# Patient Record
Sex: MALE | Race: WHITE | Employment: FULL TIME | URBAN - NONMETROPOLITAN AREA
[De-identification: names, ages, dates, MRNs, and addresses within clinical notes are randomized per-mention and may not be internally consistent; named-entity substitution may affect disease eponyms.]

---

## 2018-08-13 LAB
ALBUMIN SERPL-MCNC: 4.2 G/DL
ALP BLD-CCNC: 65 U/L
ALT SERPL-CCNC: 24 U/L
ANION GAP SERPL CALCULATED.3IONS-SCNC: 11 MMOL/L
AST SERPL-CCNC: 21 U/L
BASOPHILS ABSOLUTE: 100 /ΜL
BASOPHILS RELATIVE PERCENT: 0.7 %
BILIRUB SERPL-MCNC: 1 MG/DL (ref 0.1–1.4)
BUN BLDV-MCNC: 18 MG/DL
CALCIUM SERPL-MCNC: 9 MG/DL
CHLORIDE BLD-SCNC: 101 MMOL/L
CO2: 26 MMOL/L
CREAT SERPL-MCNC: 1.26 MG/DL
EOSINOPHILS ABSOLUTE: 300 /ΜL
EOSINOPHILS RELATIVE PERCENT: 3.5 %
GFR CALCULATED: NORMAL
GLUCOSE BLD-MCNC: 109 MG/DL
HCT VFR BLD CALC: 44 % (ref 41–53)
HEMOGLOBIN: 14.9 G/DL (ref 13.5–17.5)
LYMPHOCYTES ABSOLUTE: 1900 /ΜL
LYMPHOCYTES RELATIVE PERCENT: 24.3 %
MCH RBC QN AUTO: 30.9 PG
MCHC RBC AUTO-ENTMCNC: 33.8 G/DL
MCV RBC AUTO: 91.4 FL
MONOCYTES ABSOLUTE: 800 /ΜL
MONOCYTES RELATIVE PERCENT: 10.1 %
NEUTROPHILS ABSOLUTE: 4800 /ΜL
NEUTROPHILS RELATIVE PERCENT: 61.4 %
PDW BLD-RTO: 13.3 %
PLATELET # BLD: 246 K/ΜL
PMV BLD AUTO: NORMAL FL
POTASSIUM SERPL-SCNC: 3.7 MMOL/L
RBC # BLD: 4.81 10^6/ΜL
SODIUM BLD-SCNC: 138 MMOL/L
TOTAL PROTEIN: 6.3
WBC # BLD: 7.8 10^3/ML

## 2018-08-14 ENCOUNTER — HOSPITAL ENCOUNTER (OUTPATIENT)
Dept: CT IMAGING | Age: 58
Discharge: HOME | End: 2018-08-14
Payer: COMMERCIAL

## 2018-08-14 ENCOUNTER — TELEPHONE (OUTPATIENT)
Dept: UROLOGY | Age: 58
End: 2018-08-14

## 2018-08-14 ENCOUNTER — HOSPITAL ENCOUNTER (OUTPATIENT)
Dept: GENERAL RADIOLOGY | Age: 58
Discharge: HOME | End: 2018-08-14
Payer: COMMERCIAL

## 2018-08-14 ENCOUNTER — OFFICE VISIT (OUTPATIENT)
Dept: UROLOGY | Age: 58
End: 2018-08-14
Payer: COMMERCIAL

## 2018-08-14 ENCOUNTER — HOSPITAL ENCOUNTER (OUTPATIENT)
Age: 58
Discharge: HOME | End: 2018-08-14
Payer: COMMERCIAL

## 2018-08-14 VITALS
SYSTOLIC BLOOD PRESSURE: 116 MMHG | HEIGHT: 69 IN | WEIGHT: 198 LBS | DIASTOLIC BLOOD PRESSURE: 70 MMHG | BODY MASS INDEX: 29.33 KG/M2

## 2018-08-14 DIAGNOSIS — Z01.818 PREOPERATIVE TESTING: ICD-10-CM

## 2018-08-14 DIAGNOSIS — N20.0 KIDNEY STONE: Primary | ICD-10-CM

## 2018-08-14 DIAGNOSIS — Z00.6 ENCOUNTER FOR EXAMINATION FOR NORMAL COMPARISON AND CONTROL IN CLINICAL RESEARCH PROGRAM: ICD-10-CM

## 2018-08-14 DIAGNOSIS — N20.0 KIDNEY STONE: ICD-10-CM

## 2018-08-14 LAB
ALBUMIN SERPL-MCNC: 4.2 G/DL
ALP BLD-CCNC: 65 U/L
ALT SERPL-CCNC: 24 U/L
ANION GAP SERPL CALCULATED.3IONS-SCNC: 11 MMOL/L
AST SERPL-CCNC: 21 U/L
BASOPHILS ABSOLUTE: 100 /ΜL
BASOPHILS RELATIVE PERCENT: 0.7 %
BILIRUB SERPL-MCNC: 1 MG/DL (ref 0.1–1.4)
BILIRUBIN URINE: NEGATIVE
BLOOD URINE, POC: ABNORMAL
BUN BLDV-MCNC: 18 MG/DL
CALCIUM SERPL-MCNC: 9 MG/DL
CHARACTER, URINE: CLEAR
CHLORIDE BLD-SCNC: 101 MMOL/L
CO2: 26 MMOL/L
COLOR, URINE: YELLOW
CREAT SERPL-MCNC: 1.26 MG/DL
EOSINOPHILS ABSOLUTE: 300 /ΜL
EOSINOPHILS RELATIVE PERCENT: 3.5 %
GFR CALCULATED: NORMAL
GLUCOSE BLD-MCNC: 109 MG/DL
GLUCOSE URINE: NEGATIVE MG/DL
HCT VFR BLD CALC: 44 % (ref 41–53)
HEMOGLOBIN: 14.9 G/DL (ref 13.5–17.5)
KETONES, URINE: NEGATIVE
LEUKOCYTE CLUMPS, URINE: NEGATIVE
LYMPHOCYTES ABSOLUTE: 1900 /ΜL
LYMPHOCYTES RELATIVE PERCENT: 24.3 %
MCH RBC QN AUTO: 30.9 PG
MCHC RBC AUTO-ENTMCNC: 33.8 G/DL
MCV RBC AUTO: 91.4 FL
MONOCYTES ABSOLUTE: 800 /ΜL
MONOCYTES RELATIVE PERCENT: 10.1 %
NEUTROPHILS ABSOLUTE: 4800 /ΜL
NEUTROPHILS RELATIVE PERCENT: 61.4 %
NITRITE, URINE: NEGATIVE
PDW BLD-RTO: 13.3 %
PH, URINE: 6
PLATELET # BLD: 246 K/ΜL
PMV BLD AUTO: NORMAL FL
POTASSIUM SERPL-SCNC: 3.7 MMOL/L
PROTEIN, URINE: NEGATIVE MG/DL
RBC # BLD: 4.81 10^6/ΜL
SODIUM BLD-SCNC: 138 MMOL/L
SPECIFIC GRAVITY, URINE: 1.01 (ref 1–1.03)
TOTAL PROTEIN: 6.3
UROBILINOGEN, URINE: 0.2 EU/DL
WBC # BLD: 7.8 10^3/ML

## 2018-08-14 PROCEDURE — G8427 DOCREV CUR MEDS BY ELIG CLIN: HCPCS | Performed by: NURSE PRACTITIONER

## 2018-08-14 PROCEDURE — 1036F TOBACCO NON-USER: CPT | Performed by: NURSE PRACTITIONER

## 2018-08-14 PROCEDURE — 3017F COLORECTAL CA SCREEN DOC REV: CPT | Performed by: NURSE PRACTITIONER

## 2018-08-14 PROCEDURE — 3209999900 CT COMPARISON OF OUTSIDE FILMS

## 2018-08-14 PROCEDURE — 81003 URINALYSIS AUTO W/O SCOPE: CPT | Performed by: NURSE PRACTITIONER

## 2018-08-14 PROCEDURE — G8419 CALC BMI OUT NRM PARAM NOF/U: HCPCS | Performed by: NURSE PRACTITIONER

## 2018-08-14 PROCEDURE — 99203 OFFICE O/P NEW LOW 30 MIN: CPT | Performed by: NURSE PRACTITIONER

## 2018-08-14 PROCEDURE — 71046 X-RAY EXAM CHEST 2 VIEWS: CPT

## 2018-08-14 RX ORDER — ATORVASTATIN CALCIUM 20 MG/1
20 TABLET, FILM COATED ORAL DAILY
COMMUNITY

## 2018-08-14 RX ORDER — OMEPRAZOLE 20 MG/1
20 CAPSULE, DELAYED RELEASE ORAL DAILY
COMMUNITY

## 2018-08-14 RX ORDER — FLUTICASONE PROPIONATE 50 MCG
1 SPRAY, SUSPENSION (ML) NASAL DAILY
COMMUNITY

## 2018-08-14 RX ORDER — ESCITALOPRAM OXALATE 10 MG/1
10 TABLET ORAL DAILY
COMMUNITY

## 2018-08-14 NOTE — PROGRESS NOTES
normal. No rashes or lesions. Psychiatric:        Normal mood and affect. Labs   WBC:    Lab Results   Component Value Date    WBC 7.8 08/14/2018     Hemoglobin/Hematocrit:    Lab Results   Component Value Date    HGB 14.9 08/14/2018    HCT 44.0 08/14/2018     BMP:    Lab Results   Component Value Date     08/14/2018    K 3.7 08/14/2018     08/14/2018    CO2 26 08/14/2018    BUN 18 08/14/2018    LABALBU 4.2 08/14/2018    CREATININE 1.26 08/14/2018    CALCIUM 9.0 08/14/2018     PT/INR:  No results found for: PROTIME, INR  PTT:  No results found for: APTT[APTT  Urinalysis:   Lab Results   Component Value Date    BLOODU Large 08/14/2018    NITRU Negative 08/14/2018    COLORU Yellow 08/14/2018     Urine Culture:  No results found for: Lakeisha Kirk  Blood Culture:  No results found for: [facility]      Radiology  The patient has had a CT Stone Protocol which I have reviewed along with its accompanying report. The study demonstrates:        Results for POC orders placed in visit on 08/14/18   POCT Urinalysis No Micro (Auto)   Result Value Ref Range    Glucose, Ur Negative NEGATIVE mg/dl    Bilirubin Urine Negative     Ketones, Urine Negative NEGATIVE    Specific Gravity, Urine 1.015 1.002 - 1.03    Blood, UA POC Large (A) NEGATIVE    pH, Urine 6.00 5.0 - 9.0    Protein, Urine Negative NEGATIVE mg/dl    Urobilinogen, Urine 0.20 0.0 - 1.0 eu/dl    Nitrite, Urine Negative NEGATIVE    Leukocyte Clumps, Urine Negative NEGATIVE    Color, Urine Yellow YELLOW-STR    Character, Urine Clear [id number]       Assessment    Left Proximal Ureteral Stone  Left Hydroureternephrosis  Bilateral Renal Stones  SYL      Plan    Patient is having significant pain and would like the stone taken care of as well as the stones in his left kidney. He will be scheduled for Cystoscopy, left ureteroscopy, left ureter-nephroscopy, laser lithotripsy, basket retrieval of stone fragments, and left ureteral stent placement per Dr. Larry Harden.  NPO

## 2018-08-15 ENCOUNTER — ANESTHESIA (OUTPATIENT)
Dept: OPERATING ROOM | Age: 58
End: 2018-08-15
Payer: COMMERCIAL

## 2018-08-15 ENCOUNTER — HOSPITAL ENCOUNTER (OUTPATIENT)
Age: 58
Setting detail: OUTPATIENT SURGERY
Discharge: HOME | End: 2018-08-15
Attending: UROLOGY | Admitting: UROLOGY
Payer: COMMERCIAL

## 2018-08-15 ENCOUNTER — ANESTHESIA EVENT (OUTPATIENT)
Dept: OPERATING ROOM | Age: 58
End: 2018-08-15
Payer: COMMERCIAL

## 2018-08-15 VITALS
RESPIRATION RATE: 5 BRPM | DIASTOLIC BLOOD PRESSURE: 52 MMHG | OXYGEN SATURATION: 100 % | SYSTOLIC BLOOD PRESSURE: 87 MMHG

## 2018-08-15 VITALS
TEMPERATURE: 97.6 F | HEART RATE: 91 BPM | SYSTOLIC BLOOD PRESSURE: 135 MMHG | WEIGHT: 195 LBS | RESPIRATION RATE: 16 BRPM | DIASTOLIC BLOOD PRESSURE: 71 MMHG | BODY MASS INDEX: 28.88 KG/M2 | HEIGHT: 69 IN | OXYGEN SATURATION: 97 %

## 2018-08-15 DIAGNOSIS — N20.0 RENAL STONES: Primary | ICD-10-CM

## 2018-08-15 PROCEDURE — 7100000011 HC PHASE II RECOVERY - ADDTL 15 MIN: Performed by: UROLOGY

## 2018-08-15 PROCEDURE — 2709999900 HC NON-CHARGEABLE SUPPLY: Performed by: UROLOGY

## 2018-08-15 PROCEDURE — 7100000001 HC PACU RECOVERY - ADDTL 15 MIN: Performed by: UROLOGY

## 2018-08-15 PROCEDURE — 3700000000 HC ANESTHESIA ATTENDED CARE: Performed by: UROLOGY

## 2018-08-15 PROCEDURE — 2580000003 HC RX 258

## 2018-08-15 PROCEDURE — 3600000013 HC SURGERY LEVEL 3 ADDTL 15MIN: Performed by: UROLOGY

## 2018-08-15 PROCEDURE — C1769 GUIDE WIRE: HCPCS | Performed by: UROLOGY

## 2018-08-15 PROCEDURE — C1894 INTRO/SHEATH, NON-LASER: HCPCS | Performed by: UROLOGY

## 2018-08-15 PROCEDURE — 52356 CYSTO/URETERO W/LITHOTRIPSY: CPT | Performed by: UROLOGY

## 2018-08-15 PROCEDURE — 7100000000 HC PACU RECOVERY - FIRST 15 MIN: Performed by: UROLOGY

## 2018-08-15 PROCEDURE — 3700000001 HC ADD 15 MINUTES (ANESTHESIA): Performed by: UROLOGY

## 2018-08-15 PROCEDURE — 6370000000 HC RX 637 (ALT 250 FOR IP): Performed by: NURSE PRACTITIONER

## 2018-08-15 PROCEDURE — C2617 STENT, NON-COR, TEM W/O DEL: HCPCS | Performed by: UROLOGY

## 2018-08-15 PROCEDURE — 3600000003 HC SURGERY LEVEL 3 BASE: Performed by: UROLOGY

## 2018-08-15 PROCEDURE — 82365 CALCULUS SPECTROSCOPY: CPT

## 2018-08-15 PROCEDURE — 6360000002 HC RX W HCPCS: Performed by: NURSE ANESTHETIST, CERTIFIED REGISTERED

## 2018-08-15 PROCEDURE — C1773 RET DEV, INSERTABLE: HCPCS | Performed by: UROLOGY

## 2018-08-15 PROCEDURE — 2720000010 HC SURG SUPPLY STERILE: Performed by: UROLOGY

## 2018-08-15 PROCEDURE — 2500000003 HC RX 250 WO HCPCS: Performed by: NURSE ANESTHETIST, CERTIFIED REGISTERED

## 2018-08-15 PROCEDURE — 6370000000 HC RX 637 (ALT 250 FOR IP)

## 2018-08-15 PROCEDURE — 7100000010 HC PHASE II RECOVERY - FIRST 15 MIN: Performed by: UROLOGY

## 2018-08-15 PROCEDURE — 99999 PR OFFICE/OUTPT VISIT,PROCEDURE ONLY: CPT | Performed by: UROLOGY

## 2018-08-15 DEVICE — VARIABLE LENGTH URETERAL STENT
Type: IMPLANTABLE DEVICE | Site: URETER | Status: FUNCTIONAL
Brand: CONTOUR VL™

## 2018-08-15 RX ORDER — DEXAMETHASONE SODIUM PHOSPHATE 4 MG/ML
INJECTION, SOLUTION INTRA-ARTICULAR; INTRALESIONAL; INTRAMUSCULAR; INTRAVENOUS; SOFT TISSUE PRN
Status: DISCONTINUED | OUTPATIENT
Start: 2018-08-15 | End: 2018-08-15 | Stop reason: SDUPTHER

## 2018-08-15 RX ORDER — OXYBUTYNIN CHLORIDE 5 MG/1
5 TABLET ORAL 3 TIMES DAILY
Status: DISCONTINUED | OUTPATIENT
Start: 2018-08-15 | End: 2018-08-15 | Stop reason: HOSPADM

## 2018-08-15 RX ORDER — HYDROCODONE BITARTRATE AND ACETAMINOPHEN 5; 325 MG/1; MG/1
1 TABLET ORAL EVERY 6 HOURS PRN
Qty: 10 TABLET | Refills: 0 | Status: SHIPPED | OUTPATIENT
Start: 2018-08-15 | End: 2018-08-18

## 2018-08-15 RX ORDER — GLYCOPYRROLATE 1 MG/5 ML
SYRINGE (ML) INTRAVENOUS PRN
Status: DISCONTINUED | OUTPATIENT
Start: 2018-08-15 | End: 2018-08-15 | Stop reason: SDUPTHER

## 2018-08-15 RX ORDER — CIPROFLOXACIN 2 MG/ML
400 INJECTION, SOLUTION INTRAVENOUS
Status: DISCONTINUED | OUTPATIENT
Start: 2018-08-15 | End: 2018-08-15 | Stop reason: HOSPADM

## 2018-08-15 RX ORDER — MORPHINE SULFATE 2 MG/ML
4 INJECTION, SOLUTION INTRAMUSCULAR; INTRAVENOUS
Status: DISCONTINUED | OUTPATIENT
Start: 2018-08-15 | End: 2018-08-15 | Stop reason: HOSPADM

## 2018-08-15 RX ORDER — HYDROCODONE BITARTRATE AND ACETAMINOPHEN 5; 325 MG/1; MG/1
2 TABLET ORAL EVERY 4 HOURS PRN
Status: DISCONTINUED | OUTPATIENT
Start: 2018-08-15 | End: 2018-08-15 | Stop reason: HOSPADM

## 2018-08-15 RX ORDER — OXYBUTYNIN CHLORIDE 5 MG/1
TABLET ORAL
Status: COMPLETED
Start: 2018-08-15 | End: 2018-08-15

## 2018-08-15 RX ORDER — ATROPA BELLADONNA AND OPIUM 16.2; 3 MG/1; MG/1
SUPPOSITORY RECTAL
Status: COMPLETED
Start: 2018-08-15 | End: 2018-08-15

## 2018-08-15 RX ORDER — SODIUM CHLORIDE 9 MG/ML
INJECTION, SOLUTION INTRAVENOUS CONTINUOUS
Status: DISCONTINUED | OUTPATIENT
Start: 2018-08-15 | End: 2018-08-15 | Stop reason: HOSPADM

## 2018-08-15 RX ORDER — FENTANYL CITRATE 50 UG/ML
INJECTION, SOLUTION INTRAMUSCULAR; INTRAVENOUS PRN
Status: DISCONTINUED | OUTPATIENT
Start: 2018-08-15 | End: 2018-08-15 | Stop reason: SDUPTHER

## 2018-08-15 RX ORDER — CIPROFLOXACIN 2 MG/ML
INJECTION, SOLUTION INTRAVENOUS PRN
Status: DISCONTINUED | OUTPATIENT
Start: 2018-08-15 | End: 2018-08-15 | Stop reason: SDUPTHER

## 2018-08-15 RX ORDER — ACETAMINOPHEN 325 MG/1
650 TABLET ORAL EVERY 4 HOURS PRN
Status: DISCONTINUED | OUTPATIENT
Start: 2018-08-15 | End: 2018-08-15 | Stop reason: HOSPADM

## 2018-08-15 RX ORDER — ATROPA BELLADONNA AND OPIUM 16.2; 3 MG/1; MG/1
60 SUPPOSITORY RECTAL EVERY 8 HOURS PRN
Status: DISCONTINUED | OUTPATIENT
Start: 2018-08-15 | End: 2018-08-15

## 2018-08-15 RX ORDER — MORPHINE SULFATE 2 MG/ML
2 INJECTION, SOLUTION INTRAMUSCULAR; INTRAVENOUS
Status: DISCONTINUED | OUTPATIENT
Start: 2018-08-15 | End: 2018-08-15 | Stop reason: HOSPADM

## 2018-08-15 RX ORDER — PROPOFOL 10 MG/ML
INJECTION, EMULSION INTRAVENOUS PRN
Status: DISCONTINUED | OUTPATIENT
Start: 2018-08-15 | End: 2018-08-15 | Stop reason: SDUPTHER

## 2018-08-15 RX ORDER — HYDROCODONE BITARTRATE AND ACETAMINOPHEN 5; 325 MG/1; MG/1
1 TABLET ORAL EVERY 4 HOURS PRN
Status: DISCONTINUED | OUTPATIENT
Start: 2018-08-15 | End: 2018-08-15 | Stop reason: HOSPADM

## 2018-08-15 RX ORDER — CIPROFLOXACIN 500 MG/1
500 TABLET, FILM COATED ORAL 2 TIMES DAILY
Qty: 6 TABLET | Refills: 0 | Status: SHIPPED | OUTPATIENT
Start: 2018-08-15 | End: 2018-08-18

## 2018-08-15 RX ORDER — ATROPA BELLADONNA AND OPIUM 16.2; 3 MG/1; MG/1
30 SUPPOSITORY RECTAL EVERY 8 HOURS PRN
Status: DISCONTINUED | OUTPATIENT
Start: 2018-08-15 | End: 2018-08-15 | Stop reason: HOSPADM

## 2018-08-15 RX ORDER — OXYBUTYNIN CHLORIDE 5 MG/1
5 TABLET ORAL 3 TIMES DAILY PRN
Qty: 90 TABLET | Refills: 3 | Status: ON HOLD | OUTPATIENT
Start: 2018-08-15 | End: 2021-04-30

## 2018-08-15 RX ADMIN — SODIUM CHLORIDE: 9 INJECTION, SOLUTION INTRAVENOUS at 13:17

## 2018-08-15 RX ADMIN — OXYBUTYNIN CHLORIDE 5 MG: 5 TABLET ORAL at 17:15

## 2018-08-15 RX ADMIN — Medication 0.4 MG: at 15:39

## 2018-08-15 RX ADMIN — FENTANYL CITRATE 100 MCG: 50 INJECTION INTRAMUSCULAR; INTRAVENOUS at 15:17

## 2018-08-15 RX ADMIN — DEXAMETHASONE SODIUM PHOSPHATE 8 MG: 4 INJECTION, SOLUTION INTRAMUSCULAR; INTRAVENOUS at 15:39

## 2018-08-15 RX ADMIN — CIPROFLOXACIN 400 MG: 2 INJECTION, SOLUTION INTRAVENOUS at 15:23

## 2018-08-15 RX ADMIN — ATROPA BELLADONNA AND OPIUM 30 MG: 16.2; 3 SUPPOSITORY RECTAL at 17:50

## 2018-08-15 RX ADMIN — HYDROCODONE BITARTRATE AND ACETAMINOPHEN 2 TABLET: 5; 325 TABLET ORAL at 17:10

## 2018-08-15 RX ADMIN — PROPOFOL 200 MG: 10 INJECTION, EMULSION INTRAVENOUS at 15:17

## 2018-08-15 ASSESSMENT — PULMONARY FUNCTION TESTS
PIF_VALUE: 4
PIF_VALUE: 13
PIF_VALUE: 17
PIF_VALUE: 2
PIF_VALUE: 12
PIF_VALUE: 5
PIF_VALUE: 2
PIF_VALUE: 3
PIF_VALUE: 2
PIF_VALUE: 18
PIF_VALUE: 2
PIF_VALUE: 5
PIF_VALUE: 2
PIF_VALUE: 0
PIF_VALUE: 2
PIF_VALUE: 2
PIF_VALUE: 13
PIF_VALUE: 3
PIF_VALUE: 2
PIF_VALUE: 2
PIF_VALUE: 5
PIF_VALUE: 3
PIF_VALUE: 13
PIF_VALUE: 2
PIF_VALUE: 12
PIF_VALUE: 2
PIF_VALUE: 2
PIF_VALUE: 12
PIF_VALUE: 2
PIF_VALUE: 17
PIF_VALUE: 2
PIF_VALUE: 5
PIF_VALUE: 2
PIF_VALUE: 2
PIF_VALUE: 4
PIF_VALUE: 2
PIF_VALUE: 5
PIF_VALUE: 22

## 2018-08-15 ASSESSMENT — PAIN SCALES - GENERAL
PAINLEVEL_OUTOF10: 2
PAINLEVEL_OUTOF10: 7
PAINLEVEL_OUTOF10: 8
PAINLEVEL_OUTOF10: 8
PAINLEVEL_OUTOF10: 2
PAINLEVEL_OUTOF10: 3

## 2018-08-15 NOTE — ANESTHESIA PRE PROCEDURE
Department of Anesthesiology  Preprocedure Note       Name:  Kiarn Hernandez   Age:  61 y.o.  :  1960                                          MRN:  [id number]         Date:  8/15/2018      Surgeon: Joaquín Stroud):  Cricket Hartmann MD    Procedure: Procedure(s):  CYSTOSCOPY, LEFT URETEROSCOPY, LEFT URETERO-NEPHROSCOPY, LASER LITHOTRIPSY, BASKET RETRIEVAL OF STONE FRAGMENTS, LEFT URETERAL STEN PLACEMENT    Medications prior to admission:   Prior to Admission medications    Medication Sig Start Date End Date Taking? Authorizing Provider   ciprofloxacin (CIPRO) 500 MG tablet Take 1 tablet by mouth 2 times daily for 3 days 8/15/18 8/18/18 Yes Jeyson Smoke, APRN - CNP   oxybutynin (DITROPAN) 5 MG tablet Take 1 tablet by mouth 3 times daily as needed (bladder spasms) 8/15/18  Yes Jeyson Smoke, APRN - CNP   HYDROcodone-acetaminophen (NORCO) 5-325 MG per tablet Take 1 tablet by mouth every 6 hours as needed for Pain for up to 3 days. Intended supply: 3 days. Take lowest dose possible to manage pain. 8/15/18 8/18/18 Yes Jeyson Smoke, APRN - CNP   fluticasone (FLONASE) 50 MCG/ACT nasal spray 1 spray by Nasal route daily   Yes Historical Provider, MD   Fexofenadine HCl (ALLEGRA PO) Take by mouth daily   Yes Historical Provider, MD   omeprazole (PRILOSEC) 20 MG delayed release capsule Take 20 mg by mouth daily   Yes Historical Provider, MD   atorvastatin (LIPITOR) 20 MG tablet Take 20 mg by mouth daily   Yes Historical Provider, MD   escitalopram (LEXAPRO) 10 MG tablet Take 10 mg by mouth daily   Yes Historical Provider, MD   budesonide-formoterol (SYMBICORT) 160-4.5 MCG/ACT AERO Inhale 2 puffs into the lungs 2 times daily.      Yes Historical Provider, MD       Current medications:    Current Facility-Administered Medications   Medication Dose Route Frequency Provider Last Rate Last Dose    0.9 % sodium chloride infusion   Intravenous Continuous Meagan Lazaro  mL/hr at 26 3214     

## 2018-08-15 NOTE — PROGRESS NOTES
Pt given seirra mist pt is eating and drinking without issus. Denies need for food. Bruise on lower lip middle. Pt states he was informed that was from tube. 1715  Pt in a lot of pain. Pt up to void was able to void. Called for orders for pain medication. [id number] [phone] [id number] called Saba Carlson for more order for pain medication  1815  Pt states pain is still there. 1915 pt states pain is more tolerable. Is ready to go home.

## 2018-08-17 NOTE — H&P
Maryjane Hall is a 61 y.o. male is a new patient who was referred here by [facility] ED. Maryjane Hall was seen in the ED on 8/13/18-8/14/18 due to left flank pain. The pain started acutely yesterday. It is located in the left flank and is constant. It is described as an ache. There are no aggravating factors. CT abd/pelvis showed an 8 mm stone in the proximal left ureter with moderate left hydroureteronephrosis. Their pain was controlled so they were discharged with pain medication and Flomax.  They deny dysuria, hematuria, fever, or chills.        Past Medical History        Past Medical History:   Diagnosis Date    Allergic rhinitis      Asthma      Hyperlipidemia      Kidney stone              Past Surgical History         Past Surgical History:   Procedure Laterality Date    CONJUNCTIVAL LESION EXCISION        HERNIA REPAIR         Breann-x's 3    KIDNEY STONE SURGERY         laser lithotripsy     OTHER SURGICAL HISTORY   4-11-14     Dr. Damian Sacks of multiple skin lesions on back x 5-Dr. Jazlyn Yanes SINUS SURGERY   2004, 2010      (2004), (2010),2018                   Current Outpatient Prescriptions on File Prior to Visit   Medication Sig Dispense Refill    ranitidine (ZANTAC) 150 MG tablet Take 150 mg by mouth daily.        budesonide-formoterol (SYMBICORT) 160-4.5 MCG/ACT AERO Inhale 2 puffs into the lungs 2 times daily.            No current facility-administered medications on file prior to visit.               Allergies   Allergen Reactions    Aspirin Shortness Of Breath                 Social History   Substance Use Topics    Smoking status: Former Smoker       Quit date: 1/1/1985    Smokeless tobacco: Never Used    Alcohol use Yes          Comment: ocassionally         Family History         Family History   Problem Relation Age of Onset    Other Mother           ruptured aortic valve    Diabetes Father      Cancer Maternal Grandfather 95 the stones in his left kidney.     He will be scheduled for Cystoscopy, left ureteroscopy, left ureter-nephroscopy, laser lithotripsy, basket retrieval of stone fragments, and left ureteral stent placement per Dr. Martir Jorge. NPO at midnight.     I described the procedure in detail and also described the associated risks and benefits at length. We discussed possible alternative therapies. We discussed the risks and benefits of not undergoing therapy.  Patient understands these risks and benefits and desires to proceed.      Post-op expectations were discussed; stent pain, urinary frequency and urgency secondary to the stent, dysuria which should improve 1-2 days after procedure, and intermittent hematuria can be expected as long as stent is in place.     Follow-up 1 week after surgery for stent removal.     Mehul Maloney CNP  8/14/2018 3:25 PM

## 2018-08-17 NOTE — OP NOTE
removed. A ureteral access sheath was passed over the wire under fluoroscopic guidance and up to just below the level of the ureteral stone. A felxible ureteroscope was then passed up to the level of the stone and the stone was directly visualized. The stone was broken into removable pieces using the laser. All significant pieces were able to be grasped and gently removed using a zero-tip Nitinol basket. The scope was then advanced into the kidney where the renal stones were found. The stones were multiple and large. The stones were broken with the laser and the fragments removed. This portion of the procedure took over twice the standard time and effort due to the size and number of stones. Following the removal of the stones the wire was used to pass a 6 [employer] Annette ureteral stent. A good coil was visualized in the renal pelvis and also in the bladder via fluoroscopy as the stent was placed. The distal coil was also visualized directly via the cystoscope as the bladder was drained. Chichi Gloria was taken out of lithotomy position after being cleaned and dried. He was transferred to the PACU in stable condition. He tolerated the procedure well, there were no complications.

## 2018-08-20 LAB — STONE ANALYSIS: NORMAL

## 2018-08-23 ENCOUNTER — TELEPHONE (OUTPATIENT)
Dept: UROLOGY | Age: 58
End: 2018-08-23

## 2018-08-23 ENCOUNTER — PROCEDURE VISIT (OUTPATIENT)
Dept: UROLOGY | Age: 58
End: 2018-08-23
Payer: COMMERCIAL

## 2018-08-23 VITALS
HEIGHT: 69 IN | SYSTOLIC BLOOD PRESSURE: 122 MMHG | BODY MASS INDEX: 29.37 KG/M2 | WEIGHT: 198.3 LBS | DIASTOLIC BLOOD PRESSURE: 88 MMHG

## 2018-08-23 DIAGNOSIS — N20.0 RENAL STONES: Primary | ICD-10-CM

## 2018-08-23 DIAGNOSIS — N20.1 URETERAL STONE: ICD-10-CM

## 2018-08-23 LAB
BILIRUBIN URINE: NEGATIVE
BLOOD URINE, POC: ABNORMAL
CHARACTER, URINE: CLEAR
COLOR, URINE: ABNORMAL
GLUCOSE URINE: NEGATIVE MG/DL
KETONES, URINE: ABNORMAL
LEUKOCYTE CLUMPS, URINE: ABNORMAL
NITRITE, URINE: NEGATIVE
PH, URINE: 5.5
PROTEIN, URINE: 100 MG/DL
SPECIFIC GRAVITY, URINE: 1.02 (ref 1–1.03)
UROBILINOGEN, URINE: 0.2 EU/DL

## 2018-08-23 PROCEDURE — 52310 CYSTOSCOPY AND TREATMENT: CPT | Performed by: UROLOGY

## 2018-08-23 PROCEDURE — 81003 URINALYSIS AUTO W/O SCOPE: CPT | Performed by: UROLOGY

## 2018-08-23 PROCEDURE — 99999 PR OFFICE/OUTPT VISIT,PROCEDURE ONLY: CPT | Performed by: UROLOGY

## 2018-08-23 NOTE — LETTER
[address] [facility]  [address] [place] [id number] [address] Shine  Phone: [phone]  Fax: [phone]    Windy Wilson MD        August 23, 2018     Patient: Joaquín Ang   YOB: 1960   Date of Visit: 8/23/2018       To Whom it May Concern:    Michelle Murray was seen in my office on 8/23/2018. He may return back to work on Monday 8/27/18 with no restrictions. If you have any questions or concerns, please don't hesitate to call.     Sincerely,           Windy Wilson MD

## 2018-08-28 ENCOUNTER — HOSPITAL ENCOUNTER (EMERGENCY)
Age: 58
Discharge: HOME | End: 2018-08-28
Payer: COMMERCIAL

## 2018-08-28 VITALS
DIASTOLIC BLOOD PRESSURE: 79 MMHG | BODY MASS INDEX: 28.8 KG/M2 | SYSTOLIC BLOOD PRESSURE: 131 MMHG | WEIGHT: 195 LBS | RESPIRATION RATE: 16 BRPM | TEMPERATURE: 98.4 F | HEART RATE: 85 BPM | OXYGEN SATURATION: 97 %

## 2018-08-28 DIAGNOSIS — M79.89 SWELLING OF RIGHT HAND: ICD-10-CM

## 2018-08-28 DIAGNOSIS — M79.641 HAND PAIN, RIGHT: Primary | ICD-10-CM

## 2018-08-28 PROCEDURE — 99202 OFFICE O/P NEW SF 15 MIN: CPT | Performed by: NURSE PRACTITIONER

## 2018-08-28 PROCEDURE — 2709999900 HC NON-CHARGEABLE SUPPLY

## 2018-08-28 PROCEDURE — 99213 OFFICE O/P EST LOW 20 MIN: CPT

## 2018-08-28 RX ORDER — METHYLPREDNISOLONE 4 MG/1
TABLET ORAL
Qty: 1 KIT | Refills: 0 | Status: ON HOLD | OUTPATIENT
Start: 2018-08-28 | End: 2021-04-30

## 2018-08-28 ASSESSMENT — PAIN SCALES - GENERAL: PAINLEVEL_OUTOF10: 2

## 2018-08-28 ASSESSMENT — PAIN DESCRIPTION - LOCATION: LOCATION: HAND

## 2018-08-28 ASSESSMENT — PAIN DESCRIPTION - PAIN TYPE: TYPE: ACUTE PAIN

## 2018-08-28 ASSESSMENT — PAIN DESCRIPTION - ORIENTATION: ORIENTATION: RIGHT

## 2018-08-28 NOTE — ED NOTES
Bed: 07  Expected date:   Expected time:   Means of arrival:   Comments:  [address] [facility] [place], LPN  98/64/52 [id number]

## 2018-08-28 NOTE — ED NOTES
Pt discharged to home ace wrap applied circulation checked and good.       Claudeen Heys, RN  08/28/18 1956

## 2018-08-28 NOTE — ED TRIAGE NOTES
Pt walked to room 7. Pt here with complaints of a swollen right hand. Started swelling yesterday. Pt not sure what happened to it. No itching. Hurts to move index finger on right hand.

## 2018-08-29 ASSESSMENT — ENCOUNTER SYMPTOMS
WHEEZING: 0
CHEST TIGHTNESS: 0
VOMITING: 0
COLOR CHANGE: 1
SHORTNESS OF BREATH: 0
NAUSEA: 0

## 2018-08-29 NOTE — ED PROVIDER NOTES
[facility]  Urgent Care Encounter      CHIEF COMPLAINT       Chief Complaint   Patient presents with    Hand Pain     Right hand swollen. Pt states not sure what happened to it. No itching. Started yesterday. Nurses Notes reviewed and I agree except as noted in the HPI. HISTORY OF PRESENT ILLNESS   Stanislav Chawla is a 60 y.o. male who presents with c/o right hand pain/swelling. Onset yesterday, unchanged. No known injury. Pain is aching, intermittent. Rates 2/10, worse with movement/use. Denies numbness/tingling. Denies fever. Denies chest pain/SOB. Possible insect bite/sting. No treatment prior to arrival.    REVIEW OF SYSTEMS     Review of Systems   Constitutional: Negative for chills, diaphoresis, fatigue and fever. Respiratory: Negative for chest tightness, shortness of breath and wheezing. Cardiovascular: Negative for chest pain and palpitations. Gastrointestinal: Negative for nausea and vomiting. Musculoskeletal: Positive for arthralgias and joint swelling. Skin: Positive for color change. Negative for wound. Neurological: Negative for dizziness and headaches. Hematological: Negative for adenopathy. PAST MEDICAL HISTORY         Diagnosis Date    Allergic rhinitis     Asthma     Hyperlipidemia     Kidney stone        SURGICAL HISTORY     Patient  has a past surgical history that includes sinus surgery (2004, 2010); Kidney stone surgery; hernia repair; conjunctival lesion excision; other surgical history (4-11-14); and pr cysto/uretero/pyeloscopy, calculus tx (Left, 8/15/2018).     CURRENT MEDICATIONS       Discharge Medication List as of 8/28/2018  7:49 PM      CONTINUE these medications which have NOT CHANGED    Details   oxybutynin (DITROPAN) 5 MG tablet Take 1 tablet by mouth 3 times daily as needed (bladder spasms), Disp-90 tablet, R-3Normal      fluticasone (FLONASE) 50 MCG/ACT nasal spray 1 spray by Nasal route dailyHistorical Med

## 2021-04-09 ENCOUNTER — TELEPHONE (OUTPATIENT)
Dept: SURGERY | Age: 61
End: 2021-04-09

## 2021-04-19 ENCOUNTER — OFFICE VISIT (OUTPATIENT)
Dept: SURGERY | Age: 61
End: 2021-04-19
Payer: COMMERCIAL

## 2021-04-19 ENCOUNTER — TELEPHONE (OUTPATIENT)
Dept: SURGERY | Age: 61
End: 2021-04-19

## 2021-04-19 VITALS
WEIGHT: 201 LBS | SYSTOLIC BLOOD PRESSURE: 130 MMHG | HEIGHT: 69 IN | DIASTOLIC BLOOD PRESSURE: 72 MMHG | BODY MASS INDEX: 29.77 KG/M2 | TEMPERATURE: 97 F | OXYGEN SATURATION: 98 % | HEART RATE: 67 BPM | RESPIRATION RATE: 15 BRPM

## 2021-04-19 DIAGNOSIS — Z01.818 PRE-OP TESTING: Primary | ICD-10-CM

## 2021-04-19 DIAGNOSIS — K42.9 UMBILICAL HERNIA WITHOUT OBSTRUCTION AND WITHOUT GANGRENE: ICD-10-CM

## 2021-04-19 PROBLEM — R10.84 GENERALIZED ABDOMINAL PAIN: Status: ACTIVE | Noted: 2021-04-19

## 2021-04-19 PROCEDURE — 99203 OFFICE O/P NEW LOW 30 MIN: CPT | Performed by: SURGERY

## 2021-04-19 ASSESSMENT — ENCOUNTER SYMPTOMS
APNEA: 0
ABDOMINAL PAIN: 1
SINUS PAIN: 0
EYE DISCHARGE: 0
COUGH: 0
SHORTNESS OF BREATH: 0
EYE ITCHING: 0
WHEEZING: 0
BLOOD IN STOOL: 0
TROUBLE SWALLOWING: 0
SINUS PRESSURE: 0
SORE THROAT: 0
VOMITING: 0
DIARRHEA: 0
RECTAL PAIN: 0
ABDOMINAL DISTENTION: 0
ANAL BLEEDING: 0
EYE PAIN: 0
CHOKING: 0
FACIAL SWELLING: 0
CHEST TIGHTNESS: 0
VOICE CHANGE: 0
STRIDOR: 0
CONSTIPATION: 0
COLOR CHANGE: 0
BACK PAIN: 0
RHINORRHEA: 0
EYE REDNESS: 0
PHOTOPHOBIA: 0
NAUSEA: 0

## 2021-04-19 NOTE — TELEPHONE ENCOUNTER
Scheduled P[place]au Mention for an umbilical hernia repair poss mesh on Fri 4/30 at 9am & he will arrive at 7:30am. He was told to be npo after midnight, consent was signed & orders were given for an h&h and covid test. Antibacterial soap was also given.

## 2021-04-19 NOTE — PROGRESS NOTES
Subjective:      Patient ID: Mahduri Ames is a 63 y.o. male. Chief Complaint   Patient presents with    Surgical Consult     established pt--ref by Liu Loaiza for abdominal pain/umbilical hernia (seen on CT scan 2018)       HPI    Review of Systems   Constitutional: Negative for activity change, appetite change, chills, diaphoresis, fatigue, fever and unexpected weight change. HENT: Negative for congestion, dental problem, drooling, ear discharge, ear pain, facial swelling, hearing loss, mouth sores, nosebleeds, postnasal drip, rhinorrhea, sinus pressure, sinus pain, sneezing, sore throat, tinnitus, trouble swallowing and voice change. Eyes: Negative for photophobia, pain, discharge, redness, itching and visual disturbance. Respiratory: Negative for apnea, cough, choking, chest tightness, shortness of breath, wheezing and stridor. Cardiovascular: Negative for chest pain, palpitations and leg swelling. Gastrointestinal: Positive for abdominal pain (intermittent lower abd pain). Negative for abdominal distention, anal bleeding, blood in stool, constipation, diarrhea, nausea, rectal pain and vomiting. Endocrine: Negative for cold intolerance, heat intolerance, polydipsia, polyphagia and polyuria. Genitourinary: Negative for decreased urine volume, difficulty urinating, discharge, dysuria, enuresis, flank pain, frequency, genital sores, hematuria, penile pain, penile swelling, scrotal swelling, testicular pain and urgency. Musculoskeletal: Negative for arthralgias, back pain, gait problem, joint swelling, myalgias, neck pain and neck stiffness. Skin: Negative for color change, pallor, rash and wound. Allergic/Immunologic: Negative for environmental allergies, food allergies and immunocompromised state. Neurological: Negative for dizziness, tremors, seizures, syncope, facial asymmetry, speech difficulty, weakness, light-headedness, numbness and headaches.    Hematological: Negative for adenopathy. Does not bruise/bleed easily. Psychiatric/Behavioral: Negative for agitation, behavioral problems, confusion, decreased concentration, dysphoric mood, hallucinations, self-injury, sleep disturbance and suicidal ideas. The patient is not nervous/anxious and is not hyperactive.       /72 (Site: Right Upper Arm, Position: Sitting, Cuff Size: Medium Adult)   Pulse 67   Temp 97 °F (36.1 °C) (Tympanic)   Resp 15   Ht 5' 9\" (1.753 m)   Wt 201 lb (91.2 kg)   SpO2 98%   BMI 29.68 kg/m²     Objective:   Physical Exam    Assessment:            Plan:              Jesús Jaquez

## 2021-04-19 NOTE — PROGRESS NOTES
Amirah Bay MD [facility]  General Surgery  New Patient Evaluation in Office  Pt Name: Jillian Granado  Date of Birth 1960   Today's Date: 4/19/2021  Medical Record Number: [id number]  Referring Provider: YUN Cantu*  Primary Care Provider: Chávez Square, MD  Chief Complaint   Patient presents with   [facility] Surgical Consult     established pt--ref by Norman Red for abdominal pain/umbilical hernia (seen on CT scan 2018)     ASSESSMENT      Problem List Items Addressed This Visit     Umbilical hernia without obstruction and without gangrene    Relevant Orders    REPAIR HERNIA UMBILICAL    [id number]    Hemoglobin and Hematocrit, Blood      Other Visit Diagnoses     Pre-op testing    -  Primary    Relevant Orders    Hemoglobin and Hematocrit, Blood        Past Medical History:   Diagnosis Date    Allergic rhinitis     Asthma     Hyperlipidemia     Kidney stone           PLANS      1. Schedule Glenn for umbilical hernia repair possible mesh  2. The risks, options and alternatives for treatment were discussed in the office. We also discussed the use of mesh. Complications for the procedure were discussed including but not limited to infection, bleeding, recurrence, reoperation. The patient questions were answered and has decided to proceed with hernia repair. 3. Status: outpatient  4. Planned anesthesia: MAC   5. He will undergo pre-operative clearance per anesthesia guidelines with risk factors listed under the past medical history diagnosis & problem list.  6. Perioperative discontinuation of ASA, Plavix, warfarin, Brillinta, Effient, Pradaxa, Eliquis and Xarelto. Continuation of 81 mg Aspirin is acceptable.   7.    Orders Placed This Encounter:  Orders Placed This Encounter   Procedures    REPAIR HERNIA UMBILICAL     Standing Status:   Future     Standing Expiration Date:   4/19/2022     Order Specific Question:   Pre-procedure Diagnosis     Answer:   UMBILICAL HERNIA    [id number]     Standing Date    Allergic rhinitis     Asthma     Hyperlipidemia     Kidney stone      Past Surgical History  Past Surgical History:   Procedure Laterality Date    COLONOSCOPY  2019    Dr. Yolanda Freedman      Breann-x's 3-last one 2011    KIDNEY STONE SURGERY      laser lithotripsy     OTHER SURGICAL HISTORY  04/11/2014    Dr. Rachid Beth of multiple skin lesions on back x 5-Dr. Deann Hearn [place] CYSTO/URETERO/PYELOSCOPY, CALCULUS TX Left 08/15/2018    CYSTOSCOPY, LEFT URETEROSCOPY, LEFT URETERORENOSCOPY, LASER LITHOTRIPSY, BASKET RETRIEVAL OF STONE FRAGMENTS, LEFT URETERAL STENT PLACEMENT performed by Beverlie Leyden, MD at [facility]  2004, 2010     (2004), (2010),2018     Medications  Current Outpatient Medications on File Prior to Visit   Medication Sig Dispense Refill    fluticasone (FLONASE) 50 MCG/ACT nasal spray 1 spray by Nasal route daily      Fexofenadine HCl (ALLEGRA PO) Take by mouth daily      omeprazole (PRILOSEC) 20 MG delayed release capsule Take 20 mg by mouth daily      atorvastatin (LIPITOR) 20 MG tablet Take 20 mg by mouth daily      escitalopram (LEXAPRO) 10 MG tablet Take 10 mg by mouth daily      budesonide-formoterol (SYMBICORT) 160-4.5 MCG/ACT AERO Inhale 2 puffs into the lungs 2 times daily.  methylPREDNISolone (MEDROL DOSEPACK) 4 MG tablet Take by mouth. (Patient not taking: Reported on 4/19/2021) 1 kit 0    oxybutynin (DITROPAN) 5 MG tablet Take 1 tablet by mouth 3 times daily as needed (bladder spasms) (Patient not taking: Reported on 4/19/2021) 90 tablet 3     No current facility-administered medications on file prior to visit.       Allergies  Allergies   Allergen Reactions    Aspirin Shortness Of Breath     Family History  Family History   Problem Relation Age of Onset    Other Mother         ruptured aortic valve    Diabetes Father     Cancer Maternal Grandfather 80     Social History Pneumococcal 0-64 years Vaccine  Aged Out     Review of Systems  Constitutional: Negative for activity change, appetite change, chills, diaphoresis, fatigue, fever and unexpected weight change. HENT: Negative for congestion, dental problem, drooling, ear discharge, ear pain, facial swelling, hearing loss, mouth sores, nosebleeds, postnasal drip, rhinorrhea, sinus pressure, sinus pain, sneezing, sore throat, tinnitus, trouble swallowing and voice change. Eyes: Negative for photophobia, pain, discharge, redness, itching and visual disturbance. Respiratory: Negative for apnea, cough, choking, chest tightness, shortness of breath, wheezing and stridor. Cardiovascular: Negative for chest pain, palpitations and leg swelling. Gastrointestinal: Positive for abdominal pain (intermittent lower abd pain). Negative for abdominal distention, anal bleeding, blood in stool, constipation, diarrhea, nausea, rectal pain and vomiting. Endocrine: Negative for cold intolerance, heat intolerance, polydipsia, polyphagia and polyuria. Genitourinary: Negative for decreased urine volume, difficulty urinating, discharge, dysuria, enuresis, flank pain, frequency, genital sores, hematuria, penile pain, penile swelling, scrotal swelling, testicular pain and urgency. Musculoskeletal: Negative for arthralgias, back pain, gait problem, joint swelling, myalgias, neck pain and neck stiffness. Skin: Negative for color change, pallor, rash and wound. Allergic/Immunologic: Negative for environmental allergies, food allergies and immunocompromised state. Neurological: Negative for dizziness, tremors, seizures, syncope, facial asymmetry, speech difficulty, weakness, light-headedness, numbness and headaches. Hematological: Negative for adenopathy. Does not bruise/bleed easily.    Psychiatric/Behavioral: Negative for agitation, behavioral problems, confusion, decreased concentration, dysphoric mood, hallucinations, self-injury, sleep disturbance and suicidal ideas. The patient is not nervous/anxious and is not hyperactive. OBJECTIVE    VITALS:  height is 5' 9\" (1.753 m) and weight is 201 lb (91.2 kg). His tympanic temperature is 97 °F (36.1 °C). His blood pressure is 130/72 and his pulse is 67. His respiration is 15 and oxygen saturation is 98%. CONSTITUTIONAL: Alert and oriented times 3, no acute distress and cooperative to examination with proper mood and affect. SKIN: Skin color, texture, turgor normal. No rashes or lesions. LYMPH: no cervical nodes, no inguinal nodes  HEENT: Head is normocephalic, atraumatic. EOMI, PERRLA. NECK: Supple, symmetrical, trachea midline, no adenopathy, thyroid symmetric, not enlarged and no tenderness, skin normal.  CHEST/LUNGS: chest symmetric with normal A/P diameter, normal respiratory rate and rhythm, lungs clear to auscultation without wheezes, rales or rhonchi. No accessory muscle use. Scars None   CARDIOVASCULAR: Heart sounds are normal.  Regular rate and rhythm without murmur, gallop or rub. Normal S1 and S2. . Carotid and femoral pulses 2+/4 and equal bilaterally. ABDOMEN: Normal shape. right groin, left groin, No and Laparoscopic scar(s) present. Normal bowel sounds. No bruits. Delmon Green \"soft, nontender, nondistended, no masses or organomegaly. direct umbilical hernia is present which is not reducible. Percussion: Normal without hepatosplenomegally. Tenderness: at the umbilical hernia site. RECTAL: deferred, not clinically indicated  NEUROLOGIC: There are no focalizing motor or sensory deficits. CN II-XII are grossly intact. Delmon Green EXTREMITIES: no cyanosis, no clubbing and no edema.                Electronically signed by Shad Calvin MD on 4/19/2021 at 6:06 PM

## 2021-04-20 ENCOUNTER — TELEPHONE (OUTPATIENT)
Dept: SURGERY | Age: 61
End: 2021-04-20

## 2021-04-20 NOTE — TELEPHONE ENCOUNTER
Spoke to Avani'antonio Enciso at [place] [address] does not require any authorization for outpatient. The ref# for the call is [id number].

## 2021-04-23 ENCOUNTER — HOSPITAL ENCOUNTER (OUTPATIENT)
Age: 61
Discharge: HOME | End: 2021-04-23
Payer: COMMERCIAL

## 2021-04-23 DIAGNOSIS — Z01.818 PRE-OP TESTING: ICD-10-CM

## 2021-04-23 DIAGNOSIS — K42.9 UMBILICAL HERNIA WITHOUT OBSTRUCTION AND WITHOUT GANGRENE: ICD-10-CM

## 2021-04-23 LAB
HCT VFR BLD CALC: 42.2 % (ref 42–52)
HEMOGLOBIN: 13.4 GM/DL (ref 14–18)

## 2021-04-23 PROCEDURE — 85014 HEMATOCRIT: CPT

## 2021-04-23 PROCEDURE — U0003 INFECTIOUS AGENT DETECTION BY NUCLEIC ACID (DNA OR RNA); SEVERE ACUTE RESPIRATORY SYNDROME CORONAVIRUS 2 (SARS-COV-2) (CORONAVIRUS DISEASE [COVID-19]), AMPLIFIED PROBE TECHNIQUE, MAKING USE OF HIGH THROUGHPUT TECHNOLOGIES AS DESCRIBED BY CMS-2020-01-R: HCPCS

## 2021-04-23 PROCEDURE — 36415 COLL VENOUS BLD VENIPUNCTURE: CPT

## 2021-04-23 PROCEDURE — U0005 INFEC AGEN DETEC AMPLI PROBE: HCPCS

## 2021-04-23 PROCEDURE — 85018 HEMOGLOBIN: CPT

## 2021-04-25 LAB
SARS-COV-2: NOT DETECTED
SOURCE: NORMAL

## 2021-04-29 ENCOUNTER — ANESTHESIA EVENT (OUTPATIENT)
Dept: OPERATING ROOM | Age: 61
End: 2021-04-29
Payer: COMMERCIAL

## 2021-04-30 ENCOUNTER — HOSPITAL ENCOUNTER (OUTPATIENT)
Age: 61
Setting detail: OUTPATIENT SURGERY
Discharge: HOME | End: 2021-04-30
Attending: SURGERY | Admitting: SURGERY
Payer: COMMERCIAL

## 2021-04-30 ENCOUNTER — ANESTHESIA (OUTPATIENT)
Dept: OPERATING ROOM | Age: 61
End: 2021-04-30
Payer: COMMERCIAL

## 2021-04-30 VITALS
TEMPERATURE: 97 F | HEART RATE: 58 BPM | HEIGHT: 69 IN | OXYGEN SATURATION: 95 % | SYSTOLIC BLOOD PRESSURE: 125 MMHG | RESPIRATION RATE: 12 BRPM | BODY MASS INDEX: 29.47 KG/M2 | DIASTOLIC BLOOD PRESSURE: 78 MMHG | WEIGHT: 199 LBS

## 2021-04-30 VITALS — SYSTOLIC BLOOD PRESSURE: 110 MMHG | OXYGEN SATURATION: 99 % | DIASTOLIC BLOOD PRESSURE: 71 MMHG

## 2021-04-30 DIAGNOSIS — Z09 S/P UMBILICAL HERNIA REPAIR, FOLLOW-UP EXAM: Primary | ICD-10-CM

## 2021-04-30 PROCEDURE — 2500000003 HC RX 250 WO HCPCS: Performed by: NURSE ANESTHETIST, CERTIFIED REGISTERED

## 2021-04-30 PROCEDURE — 7100000010 HC PHASE II RECOVERY - FIRST 15 MIN: Performed by: SURGERY

## 2021-04-30 PROCEDURE — 7100000011 HC PHASE II RECOVERY - ADDTL 15 MIN: Performed by: SURGERY

## 2021-04-30 PROCEDURE — 3700000000 HC ANESTHESIA ATTENDED CARE: Performed by: SURGERY

## 2021-04-30 PROCEDURE — 3700000001 HC ADD 15 MINUTES (ANESTHESIA): Performed by: SURGERY

## 2021-04-30 PROCEDURE — 6360000002 HC RX W HCPCS: Performed by: NURSE ANESTHETIST, CERTIFIED REGISTERED

## 2021-04-30 PROCEDURE — 3600000002 HC SURGERY LEVEL 2 BASE: Performed by: SURGERY

## 2021-04-30 PROCEDURE — C1781 MESH (IMPLANTABLE): HCPCS | Performed by: SURGERY

## 2021-04-30 PROCEDURE — 49587 REPAIR UMBILICAL HERN,5+Y/O,STRANG: CPT | Performed by: SURGERY

## 2021-04-30 PROCEDURE — 6360000002 HC RX W HCPCS: Performed by: SURGERY

## 2021-04-30 PROCEDURE — 2580000003 HC RX 258: Performed by: NURSE ANESTHETIST, CERTIFIED REGISTERED

## 2021-04-30 PROCEDURE — 3600000012 HC SURGERY LEVEL 2 ADDTL 15MIN: Performed by: SURGERY

## 2021-04-30 PROCEDURE — 2500000003 HC RX 250 WO HCPCS: Performed by: SURGERY

## 2021-04-30 PROCEDURE — 2709999900 HC NON-CHARGEABLE SUPPLY: Performed by: SURGERY

## 2021-04-30 DEVICE — IMPLANTABLE DEVICE: Type: IMPLANTABLE DEVICE | Status: FUNCTIONAL

## 2021-04-30 RX ORDER — ONDANSETRON 2 MG/ML
INJECTION INTRAMUSCULAR; INTRAVENOUS PRN
Status: DISCONTINUED | OUTPATIENT
Start: 2021-04-30 | End: 2021-04-30 | Stop reason: SDUPTHER

## 2021-04-30 RX ORDER — SODIUM CHLORIDE 0.9 % (FLUSH) 0.9 %
5-40 SYRINGE (ML) INJECTION EVERY 12 HOURS SCHEDULED
Status: DISCONTINUED | OUTPATIENT
Start: 2021-04-30 | End: 2021-04-30 | Stop reason: HOSPADM

## 2021-04-30 RX ORDER — PROPOFOL 10 MG/ML
INJECTION, EMULSION INTRAVENOUS PRN
Status: DISCONTINUED | OUTPATIENT
Start: 2021-04-30 | End: 2021-04-30 | Stop reason: SDUPTHER

## 2021-04-30 RX ORDER — FENTANYL CITRATE 50 UG/ML
INJECTION, SOLUTION INTRAMUSCULAR; INTRAVENOUS PRN
Status: DISCONTINUED | OUTPATIENT
Start: 2021-04-30 | End: 2021-04-30 | Stop reason: SDUPTHER

## 2021-04-30 RX ORDER — DEXAMETHASONE SODIUM PHOSPHATE 10 MG/ML
INJECTION, EMULSION INTRAMUSCULAR; INTRAVENOUS PRN
Status: DISCONTINUED | OUTPATIENT
Start: 2021-04-30 | End: 2021-04-30 | Stop reason: SDUPTHER

## 2021-04-30 RX ORDER — SODIUM CHLORIDE 9 MG/ML
INJECTION, SOLUTION INTRAVENOUS CONTINUOUS
Status: DISCONTINUED | OUTPATIENT
Start: 2021-04-30 | End: 2021-04-30 | Stop reason: HOSPADM

## 2021-04-30 RX ORDER — SODIUM CHLORIDE 0.9 % (FLUSH) 0.9 %
5-40 SYRINGE (ML) INJECTION PRN
Status: DISCONTINUED | OUTPATIENT
Start: 2021-04-30 | End: 2021-04-30 | Stop reason: HOSPADM

## 2021-04-30 RX ORDER — ACETAMINOPHEN 500 MG
1000 TABLET ORAL ONCE
Status: DISCONTINUED | OUTPATIENT
Start: 2021-04-30 | End: 2021-04-30 | Stop reason: HOSPADM

## 2021-04-30 RX ORDER — HYDROCODONE BITARTRATE AND ACETAMINOPHEN 5; 325 MG/1; MG/1
2 TABLET ORAL EVERY 4 HOURS PRN
Status: DISCONTINUED | OUTPATIENT
Start: 2021-04-30 | End: 2021-04-30 | Stop reason: HOSPADM

## 2021-04-30 RX ORDER — SODIUM CHLORIDE 9 MG/ML
INJECTION, SOLUTION INTRAVENOUS CONTINUOUS PRN
Status: DISCONTINUED | OUTPATIENT
Start: 2021-04-30 | End: 2021-04-30 | Stop reason: SDUPTHER

## 2021-04-30 RX ORDER — HYDROCODONE BITARTRATE AND ACETAMINOPHEN 5; 325 MG/1; MG/1
1 TABLET ORAL EVERY 4 HOURS PRN
Status: DISCONTINUED | OUTPATIENT
Start: 2021-04-30 | End: 2021-04-30 | Stop reason: HOSPADM

## 2021-04-30 RX ORDER — SODIUM CHLORIDE 9 MG/ML
25 INJECTION, SOLUTION INTRAVENOUS PRN
Status: DISCONTINUED | OUTPATIENT
Start: 2021-04-30 | End: 2021-04-30 | Stop reason: HOSPADM

## 2021-04-30 RX ORDER — LIDOCAINE HCL/PF 100 MG/5ML
SYRINGE (ML) INJECTION PRN
Status: DISCONTINUED | OUTPATIENT
Start: 2021-04-30 | End: 2021-04-30 | Stop reason: SDUPTHER

## 2021-04-30 RX ORDER — HYDROCODONE BITARTRATE AND ACETAMINOPHEN 5; 325 MG/1; MG/1
1 TABLET ORAL EVERY 6 HOURS PRN
Qty: 28 TABLET | Refills: 0 | Status: SHIPPED | OUTPATIENT
Start: 2021-04-30 | End: 2021-05-07

## 2021-04-30 RX ADMIN — PROPOFOL 80 MG: 10 INJECTION, EMULSION INTRAVENOUS at 09:07

## 2021-04-30 RX ADMIN — FENTANYL CITRATE 50 MCG: 50 INJECTION, SOLUTION INTRAMUSCULAR; INTRAVENOUS at 09:07

## 2021-04-30 RX ADMIN — DEXAMETHASONE SODIUM PHOSPHATE 6 MG: 10 INJECTION, EMULSION INTRAMUSCULAR; INTRAVENOUS at 09:07

## 2021-04-30 RX ADMIN — Medication 100 MG: at 09:07

## 2021-04-30 RX ADMIN — ONDANSETRON HYDROCHLORIDE 4 MG: 4 INJECTION, SOLUTION INTRAMUSCULAR; INTRAVENOUS at 09:07

## 2021-04-30 RX ADMIN — CEFAZOLIN SODIUM 2000 MG: 10 INJECTION, POWDER, FOR SOLUTION INTRAVENOUS at 09:06

## 2021-04-30 RX ADMIN — FENTANYL CITRATE 50 MCG: 50 INJECTION, SOLUTION INTRAMUSCULAR; INTRAVENOUS at 09:01

## 2021-04-30 RX ADMIN — SODIUM CHLORIDE: 9 INJECTION, SOLUTION INTRAVENOUS at 08:37

## 2021-04-30 ASSESSMENT — PULMONARY FUNCTION TESTS
PIF_VALUE: 1
PIF_VALUE: 1
PIF_VALUE: 0
PIF_VALUE: 1
PIF_VALUE: 0

## 2021-04-30 ASSESSMENT — PAIN SCALES - GENERAL: PAINLEVEL_OUTOF10: 0

## 2021-04-30 NOTE — OP NOTE
[address] [place] [address]  Operative Report    PATIENT NAME: Gerard Temple  MEDICAL RECORD NO. [id number]  SURGEON: Hailey Zimmerman MD, FACS  Primary Care Physician: Liliya Jennings MD  Date: 4/30/2021, 9:29 AM     PROCEDURE PERFORMED: Umbilical Hernia Repair With Mesh  PREOPERATIVE DIAGNOSIS: Umbilical not reducible  POSTOPERATIVE DIAGNOSIS: Same  SURGEON:  Hailey Zimmerman MD, FACS  ANESTHESIA:  Monitored Local Anesthesia with Sedation and local  ANESTHESIA: 0.5 % Marcaine in equal parts  15 mls used  ESTIMATED BLOOD LOSS:  2  ml  SPECIMEN:  none  COMPLICATIONS:  None; patient tolerated the procedure well. DISPOSITION: sds  CONDITION: stable  a        Procedure Details     DISCUSSION:  The patient is a 63y.o.-year-old male who presented to my office and seen in evaluation  regarding umbilical hernia. After history and physical examination was performed, potential diagnostic and therapeutic modalities discussed with the patient, operative and nonoperative management was discussed, risks, complications, and benefits reviewed. Pt was given the opportunity to ask questions, and once answered, informed consent was obtained. The patient was brought to the Operating Room on 4/30/2021 for the procedure. OPERATIVE FINDINGS: At the time of exploration, the patient had a relatively  small defect to the fascia with herniated preperitoneal fat and a small  amount of omentum which was noted to be viable and the repair was  performed as described below. PROCEDURE:  The patient was brought to the Operating Room. Time out was taken, SCDs in place and signed consent on the chart. Preoperative antibiotics have been given. Pt placed in the supine position, placed under continuous cardiac telemetry, blood pressure, and pulse oximetry monitoring and placed under IV sedation by the Anesthesia Department. The  anterior abdominal wall was prepped and draped in a sterile fashion with chloroprep.  The periumbilical area

## 2021-04-30 NOTE — ANESTHESIA PRE PROCEDURE
Department of Anesthesiology  Preprocedure Note       Name:  Yuniel Fernandez   Age:  63 y.o.  :  1960                                          MRN:  [id number]         Date:  2021      Surgeon: Esperanza Young):  Garcia Donohue MD    Procedure: Procedure(s): UMBILICAL HERNIA REPAIR, POSS MESH    Medications prior to admission:   Prior to Admission medications    Medication Sig Start Date End Date Taking? Authorizing Provider   fluticasone (FLONASE) 50 MCG/ACT nasal spray 1 spray by Nasal route daily   Yes Historical Provider, MD   Fexofenadine HCl (ALLEGRA PO) Take by mouth daily   Yes Historical Provider, MD   omeprazole (PRILOSEC) 20 MG delayed release capsule Take 20 mg by mouth daily   Yes Historical Provider, MD   atorvastatin (LIPITOR) 20 MG tablet Take 20 mg by mouth daily   Yes Historical Provider, MD   escitalopram (LEXAPRO) 10 MG tablet Take 10 mg by mouth daily   Yes Historical Provider, MD   budesonide-formoterol (SYMBICORT) 160-4.5 MCG/ACT AERO Inhale 2 puffs into the lungs 2 times daily. Yes Historical Provider, MD       Current medications:    No current facility-administered medications for this encounter. Allergies:     Allergies   Allergen Reactions    Aspirin Shortness Of Breath       Problem List:    Patient Active Problem List   Diagnosis Code    Soft tissue mass M79.89    Lipoma of axilla D17.20    Lipoma of back D17.1    Lipoma of shoulder D17.20    Sebaceous cyst L72.3    Neoplasm of uncertain behavior of skin of chest D48.5    Renal stones N20.0    Ureteral stone U18.7    Umbilical hernia without obstruction and without gangrene K42.9       Past Medical History:        Diagnosis Date    Allergic rhinitis     Asthma     Hyperlipidemia     Kidney stone        Past Surgical History:        Procedure Laterality Date    COLONOSCOPY      Dr. Violet Stuart's 3-last one     KIDNEY STONE SURGERY      laser lithotripsy     OTHER SURGICAL HISTORY  2014    Dr. Rachid Beth of multiple skin lesions on back x 5-Dr. Deann Hearn [place] CYSTO/URETERO/PYELOSCOPY, CALCULUS [address] Left 08/15/2018    CYSTOSCOPY, LEFT URETEROSCOPY, LEFT URETERORENOSCOPY, LASER LITHOTRIPSY, BASKET RETRIEVAL OF STONE FRAGMENTS, LEFT URETERAL STENT PLACEMENT performed by Beverlie Leyden, MD at [place] [address]  ,      (), (),2018       Social History:    Social History     Tobacco Use    Smoking status: Former Smoker     Quit date: 1985     Years since quittin.3    Smokeless tobacco: Never Used   Substance Use Topics    Alcohol use: Yes     Comment: ocassionally                                Counseling given: Not Answered      Vital Signs (Current):   Vitals:    21 0754   BP: (!) 134/90   Pulse: 74   Resp: 18   Temp: 96.9 °F (36.1 °C)   TempSrc: Temporal   SpO2: 98%   Weight: 199 lb (90.3 kg)   Height: 5' 9\" (1.753 m)                                              BP Readings from Last 3 Encounters:   21 (!) 134/90   21 130/72   18 131/79       NPO Status: Time of last liquid consumption:                         Time of last solid consumption:                         Date of last liquid consumption: 21                        Date of last solid food consumption: 21    BMI:   Wt Readings from Last 3 Encounters:   21 199 lb (90.3 kg)   21 201 lb (91.2 kg)   18 195 lb (88.5 kg)     Body mass index is 29.39 kg/m².     CBC:   Lab Results   Component Value Date    WBC 7.8 2018    RBC 4.81 2018    HGB 13.4 2021    HCT 42.2 2021    MCV 91.4 2018    RDW 13.3 2018     2018       CMP:   Lab Results   Component Value Date     2018    K 3.7 2018     2018    CO2 26 2018    BUN 18 2018    CREATININE 1.26 2018    GLUCOSE 109 2018 CALCIUM 9.0 08/14/2018    BILITOT 1.0 08/14/2018    ALKPHOS 65 08/14/2018    AST 21 08/14/2018    ALT 24 08/14/2018       POC Tests: No results for input(s): POCGLU, POCNA, POCK, POCCL, POCBUN, POCHEMO, POCHCT in the last 72 hours. Coags: No results found for: PROTIME, INR, APTT    HCG (If Applicable): No results found for: PREGTESTUR, PREGSERUM, HCG, HCGQUANT     ABGs: No results found for: PHART, PO2ART, PQA7YJU, CSR4TLI, BEART, O9ZPZFIY     Type & Screen (If Applicable):  No results found for: LABABO, LABRH    Drug/Infectious Status (If Applicable):  No results found for: HIV, HEPCAB    COVID-19 Screening (If Applicable):   Lab Results   Component Value Date    COVID19 Not Detected 04/23/2021           Anesthesia Evaluation  Patient summary reviewed and Nursing notes reviewed no history of anesthetic complications:   Airway: Mallampati: II  TM distance: >3 FB   Neck ROM: full  Mouth opening: > = 3 FB Dental:          Pulmonary:normal exam  breath sounds clear to auscultation  (+) asthma:                            Cardiovascular:Negative CV ROS  Exercise tolerance: good (>4 METS),                     Neuro/Psych:   Negative Neuro/Psych ROS              GI/Hepatic/Renal:   (+) renal disease: kidney stones,           Endo/Other: Negative Endo/Other ROS             Pt had no PAT visit       Abdominal:           Vascular: negative vascular ROS. Anesthesia Plan      MAC     ASA 2       Induction: intravenous. Anesthetic plan and risks discussed with patient and spouse. Plan discussed with CRNA.                   [address] Ricketts, DO   4/30/2021

## 2021-04-30 NOTE — ANESTHESIA POSTPROCEDURE EVALUATION
Department of Anesthesiology  Postprocedure Note    Patient: Jillian Granado  MRN: [id number]  YOB: 1960  Date of evaluation: 4/30/2021  Time:  11:36 AM     Procedure Summary     Date: 04/30/21 Room / Location: [place] [address] / [facility]    Anesthesia Start: 1686 Anesthesia Stop: 0930    Procedure: UMBILICAL HERNIA REPAIR,  WITH MESH (N/A Abdomen) Diagnosis: (UMBILICAL HERNIA)    Surgeons: Amirah Bay MD Responsible Provider: Patricia Raymond DO    Anesthesia Type: MAC ASA Status: 2          Anesthesia Type: MAC    Nichelle Phase I: Nichelle Score: 10    Nichelle Phase II: Nichelle Score: 10    Last vitals: Reviewed and per EMR flowsheets.        Anesthesia Post Evaluation    Patient location during evaluation: bedside  Patient participation: complete - patient participated  Level of consciousness: awake and alert  Pain score: 1  Airway patency: patent  Nausea & Vomiting: no nausea and no vomiting  Complications: no  Cardiovascular status: hemodynamically stable and blood pressure returned to baseline  Respiratory status: spontaneous ventilation, room air and acceptable  Hydration status: stable

## 2021-04-30 NOTE — H&P
[address] [place] [address]  History and Physical Update    Pt Name: Swati Fairbanks  MRN: [id number]  YOB: 1960  Date of evaluation: 4/30/2021    [x] I have examined the patient and reviewed the H&P/Consult and there are no changes to the patient or plans.     [] I have examined the patient and reviewed the H&P/Consult and have noted the following changes:        Barry Floyd  Electronically signed 4/30/2021 at 8:39 AM

## 2021-04-30 NOTE — H&P
bending, coughing, lifting, standing and palliated by rest. The mass is not reducible. The patient denies any symptoms of signs or symptoms of incarceration or strangulation\". He has had prior right inguinal hernia and left inguinal hernia surgery. This hernia is not stated to be work related per patient. Past Medical History  Past Medical History:   Diagnosis Date    Allergic rhinitis     Asthma     Hyperlipidemia     Kidney stone      Past Surgical History  Past Surgical History:   Procedure Laterality Date    COLONOSCOPY  2019    Dr. Karmen Paget Sheehan-x's 3-last one 2011    KIDNEY STONE SURGERY      laser lithotripsy     OTHER SURGICAL HISTORY  04/11/2014    Dr. Zoya Shaver of multiple skin lesions on back x 5-Dr. Tawanna Maldonado [place] CYSTO/URETERO/PYELOSCOPY, CALCULUS [place] Left 08/15/2018    CYSTOSCOPY, LEFT URETEROSCOPY, LEFT URETERORENOSCOPY, LASER LITHOTRIPSY, BASKET RETRIEVAL OF STONE FRAGMENTS, LEFT URETERAL STENT PLACEMENT performed by Mao Vazquez MD at [facility]  2004, 2010     (2004), (2010),2018     Medications  No current facility-administered medications on file prior to encounter. Current Outpatient Medications on File Prior to Encounter   Medication Sig Dispense Refill    fluticasone (FLONASE) 50 MCG/ACT nasal spray 1 spray by Nasal route daily      Fexofenadine HCl (ALLEGRA PO) Take by mouth daily      omeprazole (PRILOSEC) 20 MG delayed release capsule Take 20 mg by mouth daily      atorvastatin (LIPITOR) 20 MG tablet Take 20 mg by mouth daily      escitalopram (LEXAPRO) 10 MG tablet Take 10 mg by mouth daily      budesonide-formoterol (SYMBICORT) 160-4.5 MCG/ACT AERO Inhale 2 puffs into the lungs 2 times daily.          Allergies  Allergies   Allergen Reactions    Aspirin Shortness Of Breath     Family History  Family History   Problem Relation Age of Onset    Other Mother ruptured aortic valve    Diabetes Father     Cancer Maternal Grandfather 80     Social History  Social History     Socioeconomic History    Marital status:      Spouse name: Not on file    Number of children: Not on file    Years of education: Not on file    Highest education level: Not on file   Occupational History    Not on file   Social Needs    Financial resource strain: Not on file    Food insecurity     Worry: Not on file     Inability: Not on file    Transportation needs     Medical: Not on file     Non-medical: Not on file   Tobacco Use    Smoking status: Former Smoker     Quit date: 1985     Years since quittin.3    Smokeless tobacco: Never Used   Substance and Sexual Activity    Alcohol use: Yes     Comment: ocassionally    Drug use: No    Sexual activity: Not on file   Lifestyle    Physical activity     Days per week: Not on file     Minutes per session: Not on file    Stress: Not on file   Relationships    Social connections     Talks on phone: Not on file     Gets together: Not on file     Attends Yarsani service: Not on file     Active member of club or organization: Not on file     Attends meetings of clubs or organizations: Not on file     Relationship status: Not on file    Intimate partner violence     Fear of current or ex partner: Not on file     Emotionally abused: Not on file     Physically abused: Not on file     Forced sexual activity: Not on file   Other Topics Concern    Not on file   Social History Narrative    Not on file     [address] Maintenance   Topic Date Due    Hepatitis C screen  Never done    Lipid screen  Never done    HIV screen  Never done    DTaP/Tdap/Td vaccine (1 - Tdap) Never done    Diabetes screen  Never done    Shingles Vaccine (1 of 2) Never done    Colon cancer screen colonoscopy  Never done    Flu vaccine  Completed    COVID-19 Vaccine  Completed    Hepatitis A vaccine  Aged Out    Hepatitis B problems, confusion, decreased concentration, dysphoric mood, hallucinations, self-injury, sleep disturbance and suicidal ideas. The patient is not nervous/anxious and is not hyperactive. OBJECTIVE    VITALS:  height is 5' 9\" (1.753 m) and weight is 199 lb (90.3 kg). His temporal temperature is 96.9 °F (36.1 °C). His blood pressure is 134/90 (abnormal) and his pulse is 74. His respiration is 18 and oxygen saturation is 98%. CONSTITUTIONAL: Alert and oriented times 3, no acute distress and cooperative to examination with proper mood and affect. SKIN: Skin color, texture, turgor normal. No rashes or lesions. LYMPH: no cervical nodes, no inguinal nodes  HEENT: Head is normocephalic, atraumatic. EOMI, PERRLA. NECK: Supple, symmetrical, trachea midline, no adenopathy, thyroid symmetric, not enlarged and no tenderness, skin normal.  CHEST/LUNGS: chest symmetric with normal A/P diameter, normal respiratory rate and rhythm, lungs clear to auscultation without wheezes, rales or rhonchi. No accessory muscle use. Scars None   CARDIOVASCULAR: Heart sounds are normal.  Regular rate and rhythm without murmur, gallop or rub. Normal S1 and S2. . Carotid and femoral pulses 2+/4 and equal bilaterally. ABDOMEN: Normal shape. right groin, left groin, No and Laparoscopic scar(s) present. Normal bowel sounds. No bruits. [place] \"soft, nontender, nondistended, no masses or organomegaly. direct umbilical hernia is present which is not reducible. Percussion: Normal without hepatosplenomegally. Tenderness: at the umbilical hernia site. RECTAL: deferred, not clinically indicated  NEUROLOGIC: There are no focalizing motor or sensory deficits. CN II-XII are grossly intact. [place] Kid EXTREMITIES: no cyanosis, no clubbing and no edema.                Electronically signed by Cary Dowd MD on 4/30/2021 at 8:45 AM

## 2021-05-03 ENCOUNTER — TELEPHONE (OUTPATIENT)
Dept: SURGERY | Age: 61
End: 2021-05-03

## 2021-05-11 NOTE — PROGRESS NOTES
Jose Alejandro Poole MD [facility]  General Surgery  Postprocedure Evaluation in Office  Pt Name: Chasidy Murphy  Date of Birth 1960   Today's Date: 5/12/2021  Medical Record Number: [id number]  Referring Provider: No ref. provider found  Primary Care Provider: Brooklyn Mitchell MD  Chief Complaint   Patient presents with    Post-Op Check     s/p Umbilical Hernia Repair With mesh-4/30/2021     ASSESSMENT      Problem List Items Addressed This Visit     S/P umbilical hernia repair - Primary           PLANS       Pathology reviewed with the patient who understands. All questions were answered. New Prescriptions    No medications on file     Patient Instructions   May return to full activity without restrictions May 24. Follow up: Return if symptoms worsen or fail to improve. Orders Placed This Encounter:  No orders of the defined types were placed in this encounter. Shaila Nazario is seen today for post-op follow-up. He is 2 week(s) status post open umbilical hernia repair . He is tolerating a regular diet, having regular bowel movements. Symptoms and activity have gradually improved compared to preoperative. The surgical site is clean and has no drainage. Pain is controlled without any medications. . He has compliant with postoperative instructions. He states the skin is a little swelled up and he was slightly concerned but had no drainage no fever no warmth.   Past Medical History  Past Medical History:   Diagnosis Date    Allergic rhinitis     Asthma     Hyperlipidemia     Kidney stone      Past Surgical History  Past Surgical History:   Procedure Laterality Date    COLONOSCOPY  2019    Dr. Micaela Crain-x's 3-last one 2011    KIDNEY STONE SURGERY      laser lithotripsy     OTHER SURGICAL HISTORY  04/11/2014    Dr. Wally Soria of multiple skin lesions on back x 5-Dr. Shad URRUTIA CYSTO/URETERO/PYELOSCOPY, Minutes per session: Not on file    Stress: Not on file   Relationships    Social connections     Talks on phone: Not on file     Gets together: Not on file     Attends Yazidi service: Not on file     Active member of club or organization: Not on file     Attends meetings of clubs or organizations: Not on file     Relationship status: Not on file    Intimate partner violence     Fear of current or ex partner: Not on file     Emotionally abused: Not on file     Physically abused: Not on file     Forced sexual activity: Not on file   Other Topics Concern    Not on file   Social History Narrative    Not on file     [address] Maintenance   Topic Date Due    Hepatitis C screen  Never done    Lipid screen  Never done    HIV screen  Never done    DTaP/Tdap/Td vaccine (1 - Tdap) Never done    Diabetes screen  Never done    Shingles Vaccine (1 of 2) Never done    Colon cancer screen colonoscopy  Never done    Flu vaccine  Completed    COVID-19 Vaccine  Completed    Hepatitis A vaccine  Aged Out    Hepatitis B vaccine  Aged Out    Hib vaccine  Aged Out    Meningococcal (ACWY) vaccine  Aged Out    Pneumococcal 0-64 years Vaccine  Aged Out     Review of Systems  History obtained from the patient. Constitutional: Denies any fever, chills, fatigue. Wound: Denies any rash, skin color changes or wound problems. Resp: Denies any cough, shortness of breath. CV: Denies any chest pain, orthopnea or syncope. GI: Positive for incisional discomfort only. Denies any nausea, vomiting, blood in the stool, constipation or diarrhea. : Denies any hematuria, hesitancy or dysuria. OBJECTIVE    VITALS:  height is 5' 9\" (1.753 m) and weight is 199 lb (90.3 kg). His temporal temperature is 97.1 °F (36.2 °C). His blood pressure is 118/78 and his pulse is 89. His respiration is 18 and oxygen saturation is 98%.    CONSTITUTIONAL: Alert and oriented times 3, no acute distress and cooperative to examination. SKIN: Skin color, texture, turgor normal. No rashes or lesions. INCISION: wound margins intact and healing well. No signs of infection. No drainage. They skin of the umbilicus above the curvilinear incision is edematous and swollen like lack of lymphatic drainage but this resolved over time there is no signs of warmth no redness no drainage no signs of infection. ABDOMEN: soft, nontender, nondistended, no masses or organomegaly. Bowel sounds normal. infraumbilcal scar present with prominent healing ridge. No sign of recurrence, hematoma or seroma. Tenderness to palpation incisional only   .                     Electronically signed by Esther Garcias MD, FACS on 5/12/2021 at 10:10 AM

## 2021-05-12 ENCOUNTER — OFFICE VISIT (OUTPATIENT)
Dept: SURGERY | Age: 61
End: 2021-05-12

## 2021-05-12 VITALS
WEIGHT: 199 LBS | DIASTOLIC BLOOD PRESSURE: 78 MMHG | BODY MASS INDEX: 29.47 KG/M2 | TEMPERATURE: 97.1 F | OXYGEN SATURATION: 98 % | HEART RATE: 89 BPM | HEIGHT: 69 IN | SYSTOLIC BLOOD PRESSURE: 118 MMHG | RESPIRATION RATE: 18 BRPM

## 2021-05-12 DIAGNOSIS — Z09 S/P UMBILICAL HERNIA REPAIR, FOLLOW-UP EXAM: Primary | ICD-10-CM

## 2021-05-12 PROCEDURE — 99024 POSTOP FOLLOW-UP VISIT: CPT | Performed by: SURGERY

## 2021-05-12 NOTE — LETTER
[facility]  [place] [address] [address] [facility] [address]  Phone: [phone]  Fax: [phone]    Daria Pandya MD        May 12, 2021     Patient: Trevor Clement   YOB: 1960   Date of Visit: 5/12/2021       To Whom It May Concern: It is my medical opinion that Brent Melchor may return to work on 5/24/20121 with no restrictions. If you have any questions or concerns, please don't hesitate to call.     Sincerely,          Daria Pandya MD

## 2021-05-12 NOTE — LETTER
[facility]  Didi Adams MD FACS  Phone- [phone]  Fax [phone]    Pt Name: Tony Beavers  Medical Record Number: [id number]  Date of Birth 1960   Today's Date: 5/12/2021    Sadia Rangel was evaluated in the office today. My assessment and plans are listed below. Assessment:     Hanane oDshi was seen today for post-op check. Diagnoses and all orders for this visit:    S/P umbilical hernia repair         Plan:   Pathology reviewed with the patient who understands. All questions were answered. New Prescriptions    No medications on file     Patient Instructions   May return to full activity without restrictions May 24. Follow up: Return if symptoms worsen or fail to improve. If I can provide any additional assistance or you have any concerns, please feel free to contact me. Thank you for allowing to participate in the care of your patients. Sincerely,      Didi Adams MD FACS  [address]  [address] [facility]  [place], [address]  Office: [phone]  Fax: [phone]

## 2021-05-30 PROBLEM — Z09 S/P UMBILICAL HERNIA REPAIR, FOLLOW-UP EXAM: Status: RESOLVED | Noted: 2021-04-30 | Resolved: 2021-05-30

## 2021-06-09 ENCOUNTER — OFFICE VISIT (OUTPATIENT)
Dept: SURGERY | Age: 61
End: 2021-06-09
Payer: COMMERCIAL

## 2021-06-09 ENCOUNTER — TELEPHONE (OUTPATIENT)
Dept: SURGERY | Age: 61
End: 2021-06-09

## 2021-06-09 VITALS
WEIGHT: 204 LBS | DIASTOLIC BLOOD PRESSURE: 77 MMHG | BODY MASS INDEX: 30.21 KG/M2 | HEIGHT: 69 IN | SYSTOLIC BLOOD PRESSURE: 139 MMHG | RESPIRATION RATE: 15 BRPM | OXYGEN SATURATION: 98 % | HEART RATE: 77 BPM | TEMPERATURE: 97 F

## 2021-06-09 DIAGNOSIS — K43.9 EPIGASTRIC HERNIA: ICD-10-CM

## 2021-06-09 PROCEDURE — 99213 OFFICE O/P EST LOW 20 MIN: CPT | Performed by: SURGERY

## 2021-06-09 NOTE — PROGRESS NOTES
Estrellita Neri MD [facility]  General Surgery  Postprocedure Evaluation in Office  Pt Name: Morris Marinelli  Date of Birth 1960   Today's Date: 6/9/2021  Medical Record Number: [id number]  Referring Provider: No ref. provider found  Primary Care Provider: Fermin Wilson MD  Chief Complaint   Patient presents with    Post-Op Check     s/p Umbilical Hernia Repair With Mesh-4/30/2021 Last seen in the office on 5/12/2021-bulge at lower end of incision     ASSESSMENT      Problem List Items Addressed This Visit     Epigastric hernia    Relevant Orders    LAPAROSCOPY REPAIR HERNIA UMBILICAL / [facility] / [facility]       1. Robotic assisted repair epigastric hernia. This developed about an inch above his prior repair and we discussed the options of repair in the office and I recommended a laparoscopic approach so that we get a good look at the back of the rest the epigastric area to make sure there are not any other defects rather than trying to order a CT scan first.  2. Gen  3. OP  4. [address] [place] [address]   Pathology reviewed with the patient who understands. All questions were answered. New Prescriptions    No medications on file     There are no Patient Instructions on file for this visit. Follow up: No follow-ups on file. Orders Placed This Encounter:  Orders Placed This Encounter   Procedures    LAPAROSCOPY REPAIR HERNIA UMBILICAL / [employer] [facility] / INCISIONAL     Standing Status:   Future     Standing Expiration Date:   6/9/2022     Order Specific Question:   Pre-procedure Diagnosis     Answer:   Kait Read is seen today for post-op follow-up. He is 6 week(s) status post open umbilical hernia repair . He says he feels a lump above his bellybutton and wanted to be evaluated. His surgery was performed on April 30 for an umbilical defect where a small Ventralex patch was placed under the defect. Sumit Ayala He is tolerating a regular diet, having regular bowel movements.  Symptoms and  Hepatitis B vaccine  Aged Out    Hib vaccine  Aged Out    Meningococcal (ACWY) vaccine  Aged Out    Pneumococcal 0-64 years Vaccine  Aged Out     Review of Systems  History obtained from the patient. Constitutional: Denies any fever, chills, fatigue. Wound: Denies any rash, skin color changes or wound problems. Resp: Denies any cough, shortness of breath. CV: Denies any chest pain, orthopnea or syncope. GI: Positive for incisional discomfort only. Denies any nausea, vomiting, blood in the stool, constipation or diarrhea. : Denies any hematuria, hesitancy or dysuria. OBJECTIVE    VITALS:  height is 5' 9\" (1.753 m) and weight is 204 lb (92.5 kg). His tympanic temperature is 97 °F (36.1 °C). His blood pressure is 139/77 and his pulse is 77. His respiration is 15 and oxygen saturation is 98%. CONSTITUTIONAL: Alert and oriented times 3, no acute distress and cooperative to examination. SKIN: Skin color, texture, turgor normal. No rashes or lesions. INCISION: wound margins intact and healing well. No signs of infection. No drainage. The surgical incision below the umbilicus intact the edema of the skin is totally resolved is no sign of infection about an inch above his umbilicus he can see a visible fullness by palpation it feels as if he has an epigastric hernia here. We reviewed his CT scans from 2018 that showed 2 defects which were only 7 mm apart from each other but they were right at the umbilicus and in 8617 1 inch above the umbilicus no visible defect was noted. .  ABDOMEN: soft, nontender, nondistended, no masses or organomegaly. Bowel sounds normal. infraumbilcal scar present with prominent healing ridge. No sign of recurrence, hematoma or seroma. Tenderness to palpation incisional only   .                     Electronically signed by Myrtle Quezada MD MD FACS on 6/9/2021 at 11:43 AM

## 2021-06-09 NOTE — TELEPHONE ENCOUNTER
Scheduled Jose Alfredo Bell for a robotic assisted repair of epigastric hernia, possible mesh on Wed 7/7 at 7:30 & he will arrive at [facility]. He will be npo after midnight, consent was signed & he will bring his covid-19 vaccine card with him to the hospital.

## 2021-06-11 ENCOUNTER — TELEPHONE (OUTPATIENT)
Dept: SURGERY | Age: 61
End: 2021-06-11

## 2021-06-11 NOTE — LETTER
[address]  [place] [address] [address] [facility] [address]  Phone: [phone]  Fax: [phone]    Cary Dowd MD        June 14, 2021     Patient: Anand Bazan   YOB: 1960   Date of Visit: 6/11/2021       To Whom It May Concern: It is my medical opinion that Raman Bishop remain off of work effective 6/14/2021. Surgery is scheduled for 6/22/2021 at [facility]. He will remain off for approximately 2 weeks post operatively. If you have any questions or concerns, please don't hesitate to call.     Sincerely,        Cary Dowd MD

## 2021-06-14 ENCOUNTER — TELEPHONE (OUTPATIENT)
Dept: SURGERY | Age: 61
End: 2021-06-14

## 2021-06-14 NOTE — TELEPHONE ENCOUNTER
Ritamaty Collado called in wanting to move his surgery date up as he is having increased pain when working. He is now scheduled on Tues 6/22 at 7:30am at the [facility] & he will arrive at 6:30am. He was reminded to be npo after midnight & bring a .

## 2021-06-15 ENCOUNTER — TELEPHONE (OUTPATIENT)
Dept: SURGERY | Age: 61
End: 2021-06-15

## 2021-06-21 ENCOUNTER — ANESTHESIA EVENT (OUTPATIENT)
Dept: OPERATING ROOM | Age: 61
End: 2021-06-21
Payer: COMMERCIAL

## 2021-06-22 ENCOUNTER — HOSPITAL ENCOUNTER (OUTPATIENT)
Age: 61
Setting detail: OUTPATIENT SURGERY
Discharge: HOME | End: 2021-06-22
Attending: SURGERY | Admitting: SURGERY
Payer: COMMERCIAL

## 2021-06-22 ENCOUNTER — ANESTHESIA (OUTPATIENT)
Dept: OPERATING ROOM | Age: 61
End: 2021-06-22
Payer: COMMERCIAL

## 2021-06-22 VITALS
DIASTOLIC BLOOD PRESSURE: 103 MMHG | OXYGEN SATURATION: 99 % | SYSTOLIC BLOOD PRESSURE: 165 MMHG | TEMPERATURE: 96.8 F | RESPIRATION RATE: 9 BRPM

## 2021-06-22 VITALS
HEIGHT: 69 IN | HEART RATE: 82 BPM | TEMPERATURE: 96.7 F | RESPIRATION RATE: 11 BRPM | DIASTOLIC BLOOD PRESSURE: 78 MMHG | OXYGEN SATURATION: 94 % | BODY MASS INDEX: 30.21 KG/M2 | SYSTOLIC BLOOD PRESSURE: 145 MMHG | WEIGHT: 204 LBS

## 2021-06-22 DIAGNOSIS — Z87.19 S/P VENTRAL HERNIORRHAPHY: Primary | ICD-10-CM

## 2021-06-22 DIAGNOSIS — Z98.890 S/P VENTRAL HERNIORRHAPHY: Primary | ICD-10-CM

## 2021-06-22 PROCEDURE — 7100000010 HC PHASE II RECOVERY - FIRST 15 MIN: Performed by: SURGERY

## 2021-06-22 PROCEDURE — 6360000002 HC RX W HCPCS

## 2021-06-22 PROCEDURE — 2580000003 HC RX 258: Performed by: SURGERY

## 2021-06-22 PROCEDURE — 3700000000 HC ANESTHESIA ATTENDED CARE: Performed by: SURGERY

## 2021-06-22 PROCEDURE — 3600000019 HC SURGERY ROBOT ADDTL 15MIN: Performed by: SURGERY

## 2021-06-22 PROCEDURE — 7100000001 HC PACU RECOVERY - ADDTL 15 MIN: Performed by: SURGERY

## 2021-06-22 PROCEDURE — 6360000002 HC RX W HCPCS: Performed by: SURGERY

## 2021-06-22 PROCEDURE — 7100000011 HC PHASE II RECOVERY - ADDTL 15 MIN: Performed by: SURGERY

## 2021-06-22 PROCEDURE — C1781 MESH (IMPLANTABLE): HCPCS | Performed by: SURGERY

## 2021-06-22 PROCEDURE — 7100000000 HC PACU RECOVERY - FIRST 15 MIN: Performed by: SURGERY

## 2021-06-22 PROCEDURE — 3600000009 HC SURGERY ROBOT BASE: Performed by: SURGERY

## 2021-06-22 PROCEDURE — 6370000000 HC RX 637 (ALT 250 FOR IP): Performed by: SURGERY

## 2021-06-22 PROCEDURE — 49656 PR LAP, RECURRENT INCISIONAL HERNIA REPAIR,REDUCIBLE: CPT | Performed by: SURGERY

## 2021-06-22 PROCEDURE — 6360000002 HC RX W HCPCS: Performed by: ANESTHESIOLOGY

## 2021-06-22 PROCEDURE — 3700000001 HC ADD 15 MINUTES (ANESTHESIA): Performed by: SURGERY

## 2021-06-22 PROCEDURE — S2900 ROBOTIC SURGICAL SYSTEM: HCPCS | Performed by: SURGERY

## 2021-06-22 PROCEDURE — 6360000002 HC RX W HCPCS: Performed by: NURSE ANESTHETIST, CERTIFIED REGISTERED

## 2021-06-22 PROCEDURE — 2500000003 HC RX 250 WO HCPCS: Performed by: NURSE ANESTHETIST, CERTIFIED REGISTERED

## 2021-06-22 PROCEDURE — 2500000003 HC RX 250 WO HCPCS: Performed by: SURGERY

## 2021-06-22 DEVICE — MESH HERN DIA4.5IN CIR W/ ECHO PS POS SYS VENTRALIGHT ST: Type: IMPLANTABLE DEVICE | Status: FUNCTIONAL

## 2021-06-22 RX ORDER — ACETAMINOPHEN 500 MG
1000 TABLET ORAL ONCE
Status: DISCONTINUED | OUTPATIENT
Start: 2021-06-22 | End: 2021-06-22 | Stop reason: HOSPADM

## 2021-06-22 RX ORDER — ONDANSETRON 4 MG/1
4 TABLET, ORALLY DISINTEGRATING ORAL EVERY 8 HOURS PRN
Status: DISCONTINUED | OUTPATIENT
Start: 2021-06-22 | End: 2021-06-22 | Stop reason: HOSPADM

## 2021-06-22 RX ORDER — FENTANYL CITRATE 50 UG/ML
50 INJECTION, SOLUTION INTRAMUSCULAR; INTRAVENOUS EVERY 5 MIN PRN
Status: DISCONTINUED | OUTPATIENT
Start: 2021-06-22 | End: 2021-06-22 | Stop reason: HOSPADM

## 2021-06-22 RX ORDER — KETOROLAC TROMETHAMINE 30 MG/ML
30 INJECTION, SOLUTION INTRAMUSCULAR; INTRAVENOUS ONCE
Status: COMPLETED | OUTPATIENT
Start: 2021-06-22 | End: 2021-06-22

## 2021-06-22 RX ORDER — GLYCOPYRROLATE 1 MG/5 ML
SYRINGE (ML) INTRAVENOUS PRN
Status: DISCONTINUED | OUTPATIENT
Start: 2021-06-22 | End: 2021-06-22 | Stop reason: SDUPTHER

## 2021-06-22 RX ORDER — MORPHINE SULFATE 2 MG/ML
2 INJECTION, SOLUTION INTRAMUSCULAR; INTRAVENOUS
Status: DISCONTINUED | OUTPATIENT
Start: 2021-06-22 | End: 2021-06-22 | Stop reason: HOSPADM

## 2021-06-22 RX ORDER — DIPHENHYDRAMINE HYDROCHLORIDE 50 MG/ML
12.5 INJECTION INTRAMUSCULAR; INTRAVENOUS
Status: DISCONTINUED | OUTPATIENT
Start: 2021-06-22 | End: 2021-06-22 | Stop reason: HOSPADM

## 2021-06-22 RX ORDER — PROPOFOL 10 MG/ML
INJECTION, EMULSION INTRAVENOUS PRN
Status: DISCONTINUED | OUTPATIENT
Start: 2021-06-22 | End: 2021-06-22 | Stop reason: SDUPTHER

## 2021-06-22 RX ORDER — MEPERIDINE HYDROCHLORIDE 25 MG/ML
12.5 INJECTION INTRAMUSCULAR; INTRAVENOUS; SUBCUTANEOUS EVERY 5 MIN PRN
Status: DISCONTINUED | OUTPATIENT
Start: 2021-06-22 | End: 2021-06-22 | Stop reason: HOSPADM

## 2021-06-22 RX ORDER — ROCURONIUM BROMIDE 10 MG/ML
INJECTION, SOLUTION INTRAVENOUS PRN
Status: DISCONTINUED | OUTPATIENT
Start: 2021-06-22 | End: 2021-06-22 | Stop reason: SDUPTHER

## 2021-06-22 RX ORDER — HYDROCODONE BITARTRATE AND ACETAMINOPHEN 5; 325 MG/1; MG/1
1 TABLET ORAL EVERY 6 HOURS PRN
Qty: 28 TABLET | Refills: 0 | Status: SHIPPED | OUTPATIENT
Start: 2021-06-22 | End: 2021-06-29

## 2021-06-22 RX ORDER — MORPHINE SULFATE 2 MG/ML
2 INJECTION, SOLUTION INTRAMUSCULAR; INTRAVENOUS EVERY 5 MIN PRN
Status: DISCONTINUED | OUTPATIENT
Start: 2021-06-22 | End: 2021-06-22 | Stop reason: HOSPADM

## 2021-06-22 RX ORDER — SODIUM CHLORIDE 9 MG/ML
25 INJECTION, SOLUTION INTRAVENOUS PRN
Status: DISCONTINUED | OUTPATIENT
Start: 2021-06-22 | End: 2021-06-22 | Stop reason: HOSPADM

## 2021-06-22 RX ORDER — SODIUM CHLORIDE 0.9 % (FLUSH) 0.9 %
5-40 SYRINGE (ML) INJECTION EVERY 12 HOURS SCHEDULED
Status: DISCONTINUED | OUTPATIENT
Start: 2021-06-22 | End: 2021-06-22 | Stop reason: HOSPADM

## 2021-06-22 RX ORDER — ONDANSETRON 2 MG/ML
4 INJECTION INTRAMUSCULAR; INTRAVENOUS
Status: DISCONTINUED | OUTPATIENT
Start: 2021-06-22 | End: 2021-06-22 | Stop reason: HOSPADM

## 2021-06-22 RX ORDER — IBUPROFEN 800 MG/1
800 TABLET ORAL ONCE
Status: DISCONTINUED | OUTPATIENT
Start: 2021-06-22 | End: 2021-06-22 | Stop reason: HOSPADM

## 2021-06-22 RX ORDER — HYDRALAZINE HYDROCHLORIDE 20 MG/ML
5 INJECTION INTRAMUSCULAR; INTRAVENOUS EVERY 10 MIN PRN
Status: DISCONTINUED | OUTPATIENT
Start: 2021-06-22 | End: 2021-06-22 | Stop reason: HOSPADM

## 2021-06-22 RX ORDER — MORPHINE SULFATE 2 MG/ML
4 INJECTION, SOLUTION INTRAMUSCULAR; INTRAVENOUS
Status: DISCONTINUED | OUTPATIENT
Start: 2021-06-22 | End: 2021-06-22 | Stop reason: HOSPADM

## 2021-06-22 RX ORDER — ONDANSETRON 2 MG/ML
4 INJECTION INTRAMUSCULAR; INTRAVENOUS EVERY 6 HOURS PRN
Status: DISCONTINUED | OUTPATIENT
Start: 2021-06-22 | End: 2021-06-22 | Stop reason: HOSPADM

## 2021-06-22 RX ORDER — GLYCOPYRROLATE 1 MG/5 ML
SYRINGE (ML) INTRAVENOUS PRN
Status: DISCONTINUED | OUTPATIENT
Start: 2021-06-22 | End: 2021-06-22

## 2021-06-22 RX ORDER — HYDROCODONE BITARTRATE AND ACETAMINOPHEN 5; 325 MG/1; MG/1
1 TABLET ORAL EVERY 4 HOURS PRN
Status: DISCONTINUED | OUTPATIENT
Start: 2021-06-22 | End: 2021-06-22 | Stop reason: HOSPADM

## 2021-06-22 RX ORDER — BUPIVACAINE HYDROCHLORIDE 5 MG/ML
INJECTION, SOLUTION PERINEURAL PRN
Status: DISCONTINUED | OUTPATIENT
Start: 2021-06-22 | End: 2021-06-22 | Stop reason: ALTCHOICE

## 2021-06-22 RX ORDER — NEOSTIGMINE METHYLSULFATE 5 MG/5 ML
SYRINGE (ML) INTRAVENOUS PRN
Status: DISCONTINUED | OUTPATIENT
Start: 2021-06-22 | End: 2021-06-22 | Stop reason: SDUPTHER

## 2021-06-22 RX ORDER — KETOROLAC TROMETHAMINE 30 MG/ML
INJECTION, SOLUTION INTRAMUSCULAR; INTRAVENOUS
Status: COMPLETED
Start: 2021-06-22 | End: 2021-06-22

## 2021-06-22 RX ORDER — LABETALOL 20 MG/4 ML (5 MG/ML) INTRAVENOUS SYRINGE
5 4 TIMES DAILY PRN
Status: DISCONTINUED | OUTPATIENT
Start: 2021-06-22 | End: 2021-06-22 | Stop reason: HOSPADM

## 2021-06-22 RX ORDER — DEXAMETHASONE SODIUM PHOSPHATE 10 MG/ML
8 INJECTION, EMULSION INTRAMUSCULAR; INTRAVENOUS ONCE
Status: DISCONTINUED | OUTPATIENT
Start: 2021-06-22 | End: 2021-06-22 | Stop reason: HOSPADM

## 2021-06-22 RX ORDER — DEXAMETHASONE SODIUM PHOSPHATE 10 MG/ML
INJECTION, EMULSION INTRAMUSCULAR; INTRAVENOUS PRN
Status: DISCONTINUED | OUTPATIENT
Start: 2021-06-22 | End: 2021-06-22 | Stop reason: SDUPTHER

## 2021-06-22 RX ORDER — LIDOCAINE HYDROCHLORIDE 20 MG/ML
INJECTION, SOLUTION EPIDURAL; INFILTRATION; INTRACAUDAL; PERINEURAL PRN
Status: DISCONTINUED | OUTPATIENT
Start: 2021-06-22 | End: 2021-06-22 | Stop reason: SDUPTHER

## 2021-06-22 RX ORDER — SODIUM CHLORIDE 0.9 % (FLUSH) 0.9 %
5-40 SYRINGE (ML) INJECTION PRN
Status: DISCONTINUED | OUTPATIENT
Start: 2021-06-22 | End: 2021-06-22 | Stop reason: HOSPADM

## 2021-06-22 RX ORDER — HYDROCODONE BITARTRATE AND ACETAMINOPHEN 5; 325 MG/1; MG/1
2 TABLET ORAL EVERY 4 HOURS PRN
Status: DISCONTINUED | OUTPATIENT
Start: 2021-06-22 | End: 2021-06-22 | Stop reason: HOSPADM

## 2021-06-22 RX ORDER — SUCCINYLCHOLINE/SOD CL,ISO/PF 200MG/10ML
SYRINGE (ML) INTRAVENOUS PRN
Status: DISCONTINUED | OUTPATIENT
Start: 2021-06-22 | End: 2021-06-22 | Stop reason: SDUPTHER

## 2021-06-22 RX ORDER — ONDANSETRON 2 MG/ML
INJECTION INTRAMUSCULAR; INTRAVENOUS PRN
Status: DISCONTINUED | OUTPATIENT
Start: 2021-06-22 | End: 2021-06-22 | Stop reason: SDUPTHER

## 2021-06-22 RX ORDER — SODIUM CHLORIDE 9 MG/ML
INJECTION, SOLUTION INTRAVENOUS CONTINUOUS
Status: DISCONTINUED | OUTPATIENT
Start: 2021-06-22 | End: 2021-06-22 | Stop reason: HOSPADM

## 2021-06-22 RX ORDER — FENTANYL CITRATE 50 UG/ML
INJECTION, SOLUTION INTRAMUSCULAR; INTRAVENOUS PRN
Status: DISCONTINUED | OUTPATIENT
Start: 2021-06-22 | End: 2021-06-22 | Stop reason: SDUPTHER

## 2021-06-22 RX ADMIN — FENTANYL CITRATE 50 MCG: 50 INJECTION, SOLUTION INTRAMUSCULAR; INTRAVENOUS at 08:30

## 2021-06-22 RX ADMIN — Medication 0.2 MG: at 08:17

## 2021-06-22 RX ADMIN — LIDOCAINE HYDROCHLORIDE 80 MG: 20 INJECTION, SOLUTION EPIDURAL; INFILTRATION; INTRACAUDAL; PERINEURAL at 07:47

## 2021-06-22 RX ADMIN — PROPOFOL 200 MG: 10 INJECTION, EMULSION INTRAVENOUS at 07:49

## 2021-06-22 RX ADMIN — FENTANYL CITRATE 50 MCG: 50 INJECTION, SOLUTION INTRAMUSCULAR; INTRAVENOUS at 09:20

## 2021-06-22 RX ADMIN — PHENYLEPHRINE HYDROCHLORIDE 200 MCG: 10 INJECTION INTRAVENOUS at 08:03

## 2021-06-22 RX ADMIN — ROCURONIUM BROMIDE 50 MG: 10 INJECTION INTRAVENOUS at 08:00

## 2021-06-22 RX ADMIN — CEFAZOLIN 2000 MG: 10 INJECTION, POWDER, FOR SOLUTION INTRAVENOUS at 08:03

## 2021-06-22 RX ADMIN — HYDROMORPHONE HYDROCHLORIDE 0.25 MG: 1 INJECTION, SOLUTION INTRAMUSCULAR; INTRAVENOUS; SUBCUTANEOUS at 09:28

## 2021-06-22 RX ADMIN — HYDROMORPHONE HYDROCHLORIDE 0.25 MG: 1 INJECTION, SOLUTION INTRAMUSCULAR; INTRAVENOUS; SUBCUTANEOUS at 09:48

## 2021-06-22 RX ADMIN — HYDROMORPHONE HYDROCHLORIDE 0.25 MG: 1 INJECTION, SOLUTION INTRAMUSCULAR; INTRAVENOUS; SUBCUTANEOUS at 09:35

## 2021-06-22 RX ADMIN — SODIUM CHLORIDE: 9 INJECTION, SOLUTION INTRAVENOUS at 06:59

## 2021-06-22 RX ADMIN — KETOROLAC TROMETHAMINE 30 MG: 30 INJECTION, SOLUTION INTRAMUSCULAR; INTRAVENOUS at 10:41

## 2021-06-22 RX ADMIN — FENTANYL CITRATE 50 MCG: 50 INJECTION, SOLUTION INTRAMUSCULAR; INTRAVENOUS at 09:14

## 2021-06-22 RX ADMIN — HYDROMORPHONE HYDROCHLORIDE 0.25 MG: 1 INJECTION, SOLUTION INTRAMUSCULAR; INTRAVENOUS; SUBCUTANEOUS at 09:43

## 2021-06-22 RX ADMIN — PHENYLEPHRINE HYDROCHLORIDE 100 MCG: 10 INJECTION INTRAVENOUS at 08:00

## 2021-06-22 RX ADMIN — ONDANSETRON HYDROCHLORIDE 4 MG: 4 INJECTION, SOLUTION INTRAMUSCULAR; INTRAVENOUS at 08:23

## 2021-06-22 RX ADMIN — FENTANYL CITRATE 50 MCG: 50 INJECTION, SOLUTION INTRAMUSCULAR; INTRAVENOUS at 07:47

## 2021-06-22 RX ADMIN — Medication 160 MG: at 07:50

## 2021-06-22 RX ADMIN — PHENYLEPHRINE HYDROCHLORIDE 100 MCG: 10 INJECTION INTRAVENOUS at 08:17

## 2021-06-22 RX ADMIN — HYDROCODONE BITARTRATE AND ACETAMINOPHEN 2 TABLET: 5; 325 TABLET ORAL at 10:18

## 2021-06-22 RX ADMIN — Medication 4 MG: at 09:00

## 2021-06-22 RX ADMIN — DEXAMETHASONE SODIUM PHOSPHATE 6 MG: 10 INJECTION, EMULSION INTRAMUSCULAR; INTRAVENOUS at 08:23

## 2021-06-22 RX ADMIN — Medication 0.8 MG: at 09:00

## 2021-06-22 ASSESSMENT — PAIN SCALES - GENERAL
PAINLEVEL_OUTOF10: 7
PAINLEVEL_OUTOF10: 8
PAINLEVEL_OUTOF10: 7
PAINLEVEL_OUTOF10: 8
PAINLEVEL_OUTOF10: 8

## 2021-06-22 ASSESSMENT — PULMONARY FUNCTION TESTS
PIF_VALUE: 23
PIF_VALUE: 0
PIF_VALUE: 17
PIF_VALUE: 14
PIF_VALUE: 18
PIF_VALUE: 23
PIF_VALUE: 23
PIF_VALUE: 19
PIF_VALUE: 22
PIF_VALUE: 14
PIF_VALUE: 24
PIF_VALUE: 17
PIF_VALUE: 13
PIF_VALUE: 25
PIF_VALUE: 19
PIF_VALUE: 14
PIF_VALUE: 15
PIF_VALUE: -2
PIF_VALUE: 21
PIF_VALUE: 23
PIF_VALUE: 0
PIF_VALUE: 26
PIF_VALUE: 21
PIF_VALUE: 26
PIF_VALUE: 23
PIF_VALUE: 18
PIF_VALUE: 13
PIF_VALUE: 18
PIF_VALUE: 26
PIF_VALUE: 23
PIF_VALUE: 21
PIF_VALUE: 22
PIF_VALUE: 23
PIF_VALUE: 14
PIF_VALUE: 20
PIF_VALUE: 23
PIF_VALUE: 18
PIF_VALUE: 3
PIF_VALUE: 18
PIF_VALUE: 23
PIF_VALUE: 21
PIF_VALUE: 10
PIF_VALUE: 14
PIF_VALUE: 22
PIF_VALUE: 23
PIF_VALUE: 19
PIF_VALUE: 23
PIF_VALUE: 13
PIF_VALUE: 23
PIF_VALUE: 13
PIF_VALUE: 23
PIF_VALUE: 18
PIF_VALUE: 1
PIF_VALUE: 14
PIF_VALUE: 21
PIF_VALUE: 23
PIF_VALUE: 23
PIF_VALUE: 18
PIF_VALUE: 26
PIF_VALUE: 23
PIF_VALUE: 22
PIF_VALUE: 18
PIF_VALUE: 20
PIF_VALUE: 25
PIF_VALUE: 23
PIF_VALUE: 14
PIF_VALUE: 18
PIF_VALUE: 18
PIF_VALUE: 25
PIF_VALUE: 23
PIF_VALUE: 1
PIF_VALUE: 23
PIF_VALUE: 23
PIF_VALUE: 26
PIF_VALUE: 14
PIF_VALUE: 15
PIF_VALUE: 19
PIF_VALUE: 23
PIF_VALUE: 22
PIF_VALUE: 18
PIF_VALUE: 18
PIF_VALUE: 16
PIF_VALUE: 14

## 2021-06-22 ASSESSMENT — PAIN - FUNCTIONAL ASSESSMENT: PAIN_FUNCTIONAL_ASSESSMENT: 0-10

## 2021-06-22 ASSESSMENT — PAIN DESCRIPTION - PAIN TYPE: TYPE: SURGICAL PAIN

## 2021-06-22 ASSESSMENT — PAIN DESCRIPTION - LOCATION: LOCATION: ABDOMEN

## 2021-06-22 NOTE — ANESTHESIA PRE PROCEDURE
Department of Anesthesiology  Preprocedure Note       Name:  Sharita Conley   Age:  63 y.o.  :  1960                                          MRN:  [id number]         Date:  2021      Surgeon: Aron Flanagan):  Aleyda Monzon MD    Procedure: Procedure(s):  ROBOTIC REPAIR OF EPIGASTRIC HERNIA, POSS OPEN    Medications prior to admission:   Prior to Admission medications    Medication Sig Start Date End Date Taking? Authorizing Provider   fluticasone (FLONASE) 50 MCG/ACT nasal spray 1 spray by Nasal route daily   Yes Historical Provider, MD   Fexofenadine HCl (ALLEGRA PO) Take by mouth daily   Yes Historical Provider, MD   omeprazole (PRILOSEC) 20 MG delayed release capsule Take 20 mg by mouth daily   Yes Historical Provider, MD   atorvastatin (LIPITOR) 20 MG tablet Take 20 mg by mouth daily   Yes Historical Provider, MD   escitalopram (LEXAPRO) 10 MG tablet Take 10 mg by mouth daily   Yes Historical Provider, MD   budesonide-formoterol (SYMBICORT) 160-4.5 MCG/ACT AERO Inhale 2 puffs into the lungs 2 times daily.      Yes Historical Provider, MD       Current medications:    Current Facility-Administered Medications   Medication Dose Route Frequency Provider Last Rate Last Admin    acetaminophen (TYLENOL) tablet 1,000 mg  1,000 mg Oral Once Aleyda Monzon MD        ibuprofen (ADVIL;MOTRIN) tablet 800 mg  800 mg Oral Once Aleyda Monzon MD        dexamethasone (PF) (DECADRON) injection 8 mg  8 mg Intravenous Once Aleyda Monzon MD        0.9 % sodium chloride infusion   Intravenous Continuous Aleyda Monzon  mL/hr at 21 0659 New Bag at 21 0659    sodium chloride flush 0.9 % injection 5-40 mL  5-40 mL Intravenous 2 times per day Aleyda Monzon MD        sodium chloride flush 0.9 % injection 5-40 mL  5-40 mL Intravenous PRN Aleyda Monzon MD        0.9 % sodium chloride infusion  25 mL Intravenous PRN Aleyda Monzon MD        ceFAZolin (ANCEF) 2000 mg in dextrose 5 % 50 mL IVPB  2,000 mg Intravenous On Call to MD Arash           Allergies:     Allergies   Allergen Reactions    Aspirin Shortness Of Breath       Problem List:    Patient Active Problem List   Diagnosis Code    Soft tissue mass M79.89    Lipoma of axilla D17.20    Lipoma of back D17.1    Lipoma of shoulder D17.20    Sebaceous cyst L72.3    Neoplasm of uncertain behavior of skin of chest D48.5    Renal stones N20.0    Ureteral stone P15.2    Umbilical hernia without obstruction and without gangrene K42.9    Epigastric hernia K43.9       Past Medical History:        Diagnosis Date    Allergic rhinitis     Asthma     Hyperlipidemia     Kidney stone        Past Surgical History:        Procedure Laterality Date    COLONOSCOPY      Dr. Austin Porter      Breann-x's 3-last one     KIDNEY STONE SURGERY      laser lithotripsy     OTHER SURGICAL HISTORY  2014    Dr. Citlaly Mota of multiple skin lesions on back x 5-Dr. Denise Bojorquez [place] CYSTO/URETERO/PYELOSCOPY, CALCULUS [place] Left 08/15/2018    CYSTOSCOPY, LEFT URETEROSCOPY, LEFT URETERORENOSCOPY, LASER LITHOTRIPSY, BASKET RETRIEVAL OF STONE FRAGMENTS, LEFT URETERAL STENT PLACEMENT performed by Praful Fierro MD at [address] [place] [address]  ,      (), [id number]    UMBILICAL HERNIA REPAIR N/A     UMBILICAL HERNIA REPAIR,  WITH MESH performed by Barry Floyd MD at [place] [address] History:    Social History     Tobacco Use    Smoking status: Former Smoker     Quit date: 1985     Years since quittin.4    Smokeless tobacco: Never Used   Substance Use Topics    Alcohol use: Yes     Comment: ocassionally                                Counseling given: Not Answered      Vital Signs (Current):   Vitals:    21 0647   BP: 119/86   Pulse: 74   Resp: 16   Temp: 97.3 °F (36.3 °C)   TempSrc: Temporal SpO2: 95%   Weight: 204 lb (92.5 kg)   Height: 5' 9\" (1.753 m)                                              BP Readings from Last 3 Encounters:   06/22/21 119/86   06/09/21 139/77   05/12/21 118/78       NPO Status: Time of last liquid consumption: 2200                        Time of last solid consumption: 1700                        Date of last liquid consumption: 06/21/21                        Date of last solid food consumption: 06/21/21    BMI:   Wt Readings from Last 3 Encounters:   06/22/21 204 lb (92.5 kg)   06/09/21 204 lb (92.5 kg)   05/12/21 199 lb (90.3 kg)     Body mass index is 30.13 kg/m². CBC:   Lab Results   Component Value Date    WBC 7.8 08/14/2018    RBC 4.81 08/14/2018    HGB 13.4 04/23/2021    HCT 42.2 04/23/2021    MCV 91.4 08/14/2018    RDW 13.3 08/14/2018     08/14/2018       CMP:   Lab Results   Component Value Date     08/14/2018    K 3.7 08/14/2018     08/14/2018    CO2 26 08/14/2018    BUN 18 08/14/2018    CREATININE 1.26 08/14/2018    GLUCOSE 109 08/14/2018    CALCIUM 9.0 08/14/2018    BILITOT 1.0 08/14/2018    ALKPHOS 65 08/14/2018    AST 21 08/14/2018    ALT 24 08/14/2018       POC Tests: No results for input(s): POCGLU, POCNA, POCK, POCCL, POCBUN, POCHEMO, POCHCT in the last 72 hours.     Coags: No results found for: PROTIME, INR, APTT    HCG (If Applicable): No results found for: PREGTESTUR, PREGSERUM, HCG, HCGQUANT     ABGs: No results found for: PHART, PO2ART, ZCZ2HOU, FLN6KCJ, BEART, L1JOIAHK     Type & Screen (If Applicable):  No results found for: LABABO, LABRH    Drug/Infectious Status (If Applicable):  No results found for: HIV, HEPCAB    COVID-19 Screening (If Applicable):   Lab Results   Component Value Date    COVID19 Not Detected 04/23/2021           Anesthesia Evaluation    Airway: Mallampati: II        Dental:          Pulmonary:   (+) asthma:                            Cardiovascular:        (-) CAD                Neuro/Psych:      (-) CVA GI/Hepatic/Renal:             Endo/Other:                     Abdominal:           Vascular:                                        Anesthesia Plan      general     ASA 2       Induction: intravenous. Anesthetic plan and risks discussed with patient. Plan discussed with CRNA.                   Claudean Mews, MD   6/22/2021

## 2021-06-22 NOTE — PROGRESS NOTES
0908 RECEIVED IN PHASE 1 . DROWSY ON ARRIVAL RESPONSIVE TO VERBAL STIMULI. AIRWAY PATENT. O2 SAT 92% ROOM AIR . MOANING ON ARRIVAL STATES IS HAVING PAIN. O2 APPLIED 3L/ NASAL CANNULA. ABDOMINAL INCISIONS X 3 DRY AND INTACT  0914 MEDICATED FOR PAIN FENTANYL 50 MCG IV PAIN  # 8 SEE MAR  0920 PAIN UNCHANGED FENTANY L50 MCG IV REPEATED SEE MAR   0928  PAIN REMAINS . VERY SLIGHTLY IMPROVED MEDICATED SEE MAR. O2 INCREASED TO 4L/ NASAL CANNULA  0935 CONTINUE TO MEDICATE FOR PAIN # 7  NOT MUCH IMPROVED SEE MAR  0943 PAIN REMAINS # 7 . MEDICATED SEE MAR  0948 APPEARS LESS RESTLESS BUT STATES PAIN HAS NOT CHANGED MUCH MEDICATED SEE MAR  0955 O2 REMOVED AT THIS TIME  0956 O2 SATS DECREASE TO 88% O2 REAPPLIED 3L/ NASAL CANNULA  1005 TRANSITIONED TO PHASE 2  1010 O2 87% ROOM AIR. O2 REAPPLIED AT 3L/NASAL CANNULA. WIFE IN TOS EE  1015 TAKING BITES OF JELLO. O2 SAT 94 % WITH O2 3L/ NASAL CANNULA  1018 MEDICATED WITH ORAL PAIN MEDICATION FOR PAIN # 7 SEE MAR  1033 DR. ARREDONDO CALLED AND MESSAGE LEFT  1038 DR. ARREDONDO CALLED BACK. UPDATED REGARDING PAIN STATUS AND ORDERS RECEIVED. [facility] [address] TORADOL 30MG IV SEE MAR. STATES  PAIN IS TO THE RIGHT OF UMBILICUS. [address] O2 REMOVED BRIEFLY . O2 SAT  DECREASES TO 88% ROOM AIR. STATES IS PAINFUL TO TAKE DEEP BREATH. O2 REAPPLIED 3L/ NASAL CANNULA  1118 STATES PAIN IS EASING. . DISCHARGE INSTRUCTIONS GIVEN TO PATIENT AND WIFE VERBALIZE UNDERSTANDING.   1125 ASSISTED TO SIT AT BEDSIDE AND THEN TRANSFERRED TO CHAIR. TRANSFERS WITH MINIMAL DISCOMFORT. STATES PAIN # 5   1130 O2 SAT 91% ROOM AIR ENCOURAGED TO DEEP BREATH.  INCENTIVE SPIROMETER GIVEN AND INSTRUCTED TO USE  1148 O2 SAT 94% ROOM AIR  1152 GETTING DRESSED  9232 DISCHARGE VIA WHEELCHAIR TO PRIVATE CAR

## 2021-06-22 NOTE — ANESTHESIA POSTPROCEDURE EVALUATION
Department of Anesthesiology  Postprocedure Note    Patient: Becky Calvo  MRN: [id number]  [place]: 1960  Date of evaluation: 6/22/2021  Time:  12:26 PM     Procedure Summary     Date: 06/22/21 Room / Location: [address] [place] [address] 05 / [facility]    Anesthesia Start: 1540 Anesthesia Stop: 9917    Procedure: ROBOTIC REPAIR OF EPIGASTRIC HERNIA, (N/A ) Diagnosis: (EPIGASTRIC PAIN)    Surgeons: Naveed Tariq MD Responsible Provider: Jaziel Valerio MD    Anesthesia Type: general ASA Status: 2          Anesthesia Type: general    Nichelle Phase I: Nichelle Score: 8    Nichelle Phase II: Nichelle Score: 9    Last vitals: Reviewed and per EMR flowsheets.        Anesthesia Post Evaluation    Patient location during evaluation: PACU  Complications: no  Cardiovascular status: hemodynamically stable  Respiratory status: acceptable

## 2021-06-22 NOTE — OP NOTE
[facility]  Operative Report    [facility]  Operative Report    PATIENT NAME: Paola Ackerman  MEDICAL RECORD NO. [id number]  SURGEON: Cher Carney MD MD FACS  Primary Care Physician: Nerissa Oakley MD  Date: 6/22/2021, 8:48 AM       PROCEDURE PERFORMED:  Robotic Assisted Multi Port repair of Recurrent Incisonal hernia   PREOPERATIVE DIAGNOSIS: Recurrent Incisional hernia   POSTOPERATIVE DIAGNOSIS: Same  SURGEON:  Darwin Jones. Pastor Slade JENKINS FACS  ANESTHESIA:  General endotracheal anesthesia with local.  LOCAL ANESTHESIA: 0.5 % Marcaine   30 mls used  ESTIMATED BLOOD LOSS:  10  ml  SPECIMEN:  None  COMPLICATIONS:  None; patient tolerated the procedure well. DRAINS: none  DISPOSITION: PACU - hemodynamically stable. CONDITION: stable        Indications: This patient presents with a recurrent Incisional Hernia and will undergo robotic assisted multiport laparoscopic repair. Procedure Details   The patient was seen in the Holding Area. The risks, benefits, complications, treatment options, and expected outcomes were previously discussed with the patient. The possibilities of reaction to medication, pulmonary aspiration, perforation of viscus, bleeding, recurrent infection, the need for additional procedures, failure to diagnose a condition, the possible need to convert to an open procedure, and creating a complication requiring transfusion or operation were discussed with the patient. The patient and/or family concurred with the proposed plan, giving informed consent. The patient was taken to Operating Room, identified and the procedure verified as Robotic Assisted Multiport repair recurrent Incisional hernia possible open. A Time Out was held and the above information confirmed. The patient was brought to the operating room, placed supine on the operating room table, timeout was taken.  Preoperative antibiotics were given, signed consent in the chart, sequential compression devices were in place, and general anesthesia was administered. The abdomen was clipped it if necessary, and prepped and draped in sterile fashion with ChloraPrep. An LEFT Flank incision was made, the abdomen entered with a 5 mm optiview port and 5 scope under direct vision. The abdomen was insufflated to a pressure of 14 mm Hg. The Robotic videoscope was then advanced through the port and the area below insertion site was inspected and showed no evidence of injury. 1 12 and 1 8 mm mm robotic ports were placed, 1 in the LUQ in the mid claviclular line and 1 in the LLQ mid axillary line  just above the iliac crest. An  12 mm accessory port placed in the RLQ. The camera was placed and the Bipolar [place] dissector placed through port 2 and the Monopolar scissors through  port 1. A hernia was seen above his previous umbilical hernia site. It was closed with a 2-0 stratafix suture. defects were also seen in the same area, in addition to the large defect. The patch secured circumferentially  with a 2-0 double arm stratafix . Omega Godfrey The peritoneal surfaces were examined and found to be glistening. There was no free fluid. The infraumbilical port site was closed with 2-0 Vicryl suture for the fascia and the skin incisions were closed with 4-0 Vicryl subcuticular stitch. 30 cc of half percent Marcaine plain was injected around the incisions for postoperative pain control. Skin affix glue  applied, and the patient brought to recovery room in stable condition. At the end of the procedure, sponge and needle count was correct. No apparent complications were noted.                      Chano Harden MD, MD FACS  Electronically signed 6/22/2021 at 8:48 AM

## 2021-06-22 NOTE — H&P
[address] [place] [address]  History and Physical Update    Pt Name: Tony Beavers  MRN: [id number]  YOB: 1960  Date of evaluation: 6/22/2021    [x] I have examined the patient and reviewed the H&P/Consult and there are no changes to the patient or plans.     [] I have examined the patient and reviewed the H&P/Consult and have noted the following changes:        Didi Adams MD  Electronically signed 6/22/2021 at 9:53 AM
vaccine  Aged Out    Meningococcal (ACWY) vaccine  Aged Out    Pneumococcal 0-64 years Vaccine  Aged Out     Review of Systems  History obtained from the patient. Constitutional: Denies any fever, chills, fatigue. Wound: Denies any rash, skin color changes or wound problems. Resp: Denies any cough, shortness of breath. CV: Denies any chest pain, orthopnea or syncope. GI: Positive for incisional discomfort only. Denies any nausea, vomiting, blood in the stool, constipation or diarrhea. : Denies any hematuria, hesitancy or dysuria. OBJECTIVE    VITALS:  vitals were not taken for this visit. CONSTITUTIONAL: Alert and oriented times 3, no acute distress and cooperative to examination. SKIN: Skin color, texture, turgor normal. No rashes or lesions. INCISION: wound margins intact and healing well. No signs of infection. No drainage. The surgical incision below the umbilicus intact the edema of the skin is totally resolved is no sign of infection about an inch above his umbilicus he can see a visible fullness by palpation it feels as if he has an epigastric hernia here. We reviewed his CT scans from 2018 that showed 2 defects which were only 7 mm apart from each other but they were right at the umbilicus and in 3008 1 inch above the umbilicus no visible defect was noted. .  ABDOMEN: soft, nontender, nondistended, no masses or organomegaly. Bowel sounds normal. infraumbilcal scar present with prominent healing ridge. No sign of recurrence, hematoma or seroma. Tenderness to palpation incisional only   .                     Electronically signed by Guerline Norris MD MD FACS on 6/21/2021 at 7:25 AM

## 2021-06-23 ENCOUNTER — TELEPHONE (OUTPATIENT)
Dept: SURGERY | Age: 61
End: 2021-06-23

## 2021-06-23 RX ORDER — METOCLOPRAMIDE 10 MG/1
10 TABLET ORAL EVERY 6 HOURS PRN
Qty: 8 TABLET | Refills: 0 | Status: SHIPPED | OUTPATIENT
Start: 2021-06-23 | End: 2021-06-30

## 2021-06-23 NOTE — TELEPHONE ENCOUNTER
Pt is s/p robot repair of epigastric hernia yesterday. Pt calling the office stating he has had hiccups all day that will not subside and is becoming very bothersome. Any suggestions?

## 2021-06-23 NOTE — TELEPHONE ENCOUNTER
Pt states that he is doing well post op- denies any questions or concerns at this time. Advised to call the office if needed.

## 2021-06-29 NOTE — PROGRESS NOTES
Barry Floyd MD [facility]  General Surgery  Postprocedure Evaluation in Office  Pt Name: Swati Fairbanks  Date of Birth 1960   Today's Date: 6/30/2021  Medical Record Number: [id number]  Referring Provider: No ref. provider found  Primary Care Provider: Devan Toribio MD  Chief Complaint   Patient presents with   Phoebe Conine Post-Op Check     s/p Robotic Assisted [facility] repair of Recurrent Incisonal hernia-6/22/2021     ASSESSMENT      Problem List Items Addressed This Visit     S/P ventral herniorrhaphy - Primary           PLANS       Pathology reviewed with the patient who understands. All questions were answered. New Prescriptions    No medications on file     Patient Instructions   RTW on July 12, 2021 no restrictions       Follow up: Return if symptoms worsen or fail to improve. Orders Placed This Encounter:  No orders of the defined types were placed in this encounter. Jarad Pak is seen today for post-op follow-up. He is 8 day(s) status post robotic assisted repair of epigastric hernia. He is tolerating a regular diet, having regular bowel movements. Symptoms and activity have gradually improved compared to preoperative. The hiccups he had went away after couple of days and the abdominal pain he is having is much improved. The surgical site is clean and has no drainage. Pain is controlled without any medications. . He has compliant with postoperative instructions.   Past Medical History  Past Medical History:   Diagnosis Date    Allergic rhinitis     Asthma     Hyperlipidemia     Kidney stone      Past Surgical History  Past Surgical History:   Procedure Laterality Date    COLONOSCOPY  2019    Dr. Austin Stuart's 3-last one 2011   [address] N/A 6/22/2021    ROBOTIC REPAIR OF EPIGASTRIC HERNIA, performed by Barry Floyd MD at [facility] 42 lithotripsy     OTHER SURGICAL HISTORY  2014    Dr. Thalia Serrano of multiple skin lesions on back x 5-Dr. Teofilo Brothers [place] CYSTO/URETERO/PYELOSCOPY, CALCULUS [place] Left 08/15/2018    CYSTOSCOPY, LEFT URETEROSCOPY, LEFT URETERORENOSCOPY, LASER LITHOTRIPSY, BASKET RETRIEVAL OF STONE FRAGMENTS, LEFT URETERAL STENT PLACEMENT performed by Peter Morris MD at [address] [place] [address]  ,      (), [id number]    UMBILICAL HERNIA REPAIR N/A 5795    UMBILICAL HERNIA REPAIR,  WITH MESH performed by Redd Keller MD at [facility]     Medications  Current Outpatient Medications on File Prior to Visit   Medication Sig Dispense Refill    fluticasone (FLONASE) 50 MCG/ACT nasal spray 1 spray by Nasal route daily      Fexofenadine HCl (ALLEGRA PO) Take by mouth daily      omeprazole (PRILOSEC) 20 MG delayed release capsule Take 20 mg by mouth daily      atorvastatin (LIPITOR) 20 MG tablet Take 20 mg by mouth daily      escitalopram (LEXAPRO) 10 MG tablet Take 10 mg by mouth daily      budesonide-formoterol (SYMBICORT) 160-4.5 MCG/ACT AERO Inhale 2 puffs into the lungs 2 times daily. No current facility-administered medications on file prior to visit.      Allergies  Allergies   Allergen Reactions    Aspirin Shortness Of Breath     Social History  Social History     Socioeconomic History    Marital status:      Spouse name: Not on file    Number of children: Not on file    Years of education: Not on file    Highest education level: Not on file   Occupational History    Not on file   Tobacco Use    Smoking status: Former Smoker     Quit date: 1985     Years since quittin.5    Smokeless tobacco: Never Used   Substance and Sexual Activity    Alcohol use: Yes     Comment: ocassionally    Drug use: No    Sexual activity: Not on file   Other Topics Concern    Not on file   Social History Narrative    Not on file     Social Determinants of Health     Financial Resource Strain:     Difficulty of Paying Living Expenses:    Food Insecurity:     Worried About Running Out of Food in the Last Year:     [address] in the Last Year:    Transportation Needs:     Lack of Transportation (Medical):  Lack of Transportation (Non-Medical):    Physical Activity:     Days of Exercise per Week:     Minutes of Exercise per Session:    Stress:     Feeling of Stress :    Social Connections:     Frequency of Communication with Friends and Family:     Frequency of Social Gatherings with Friends and Family:     Attends [employer] witness Services:     Active Member of Clubs or Organizations:     Attends Club or Organization Meetings:     Marital Status:    Intimate Partner Violence:     Fear of Current or Ex-Partner:     Emotionally Abused:     Physically Abused:     Sexually Abused:      Health Screening Exams  Health Maintenance   Topic Date Due    Hepatitis C screen  Never done    Lipid screen  Never done    HIV screen  Never done    DTaP/Tdap/Td vaccine (1 - Tdap) Never done    Diabetes screen  Never done    Shingles Vaccine (1 of 2) Never done    Colon cancer screen colonoscopy  Never done    Flu vaccine  Completed    COVID-19 Vaccine  Completed    Hepatitis A vaccine  Aged Out    Hepatitis B vaccine  Aged Out    Hib vaccine  Aged Out    Meningococcal (ACWY) vaccine  Aged Out    Pneumococcal 0-64 years Vaccine  Aged Out     Review of Systems  History obtained from the patient. Constitutional: Denies any fever, chills, fatigue. Wound: Denies any rash, skin color changes or wound problems. Resp: Denies any cough, shortness of breath. CV: Denies any chest pain, orthopnea or syncope. GI: Positive for incisional discomfort only. Denies any nausea, vomiting, blood in the stool, constipation or diarrhea. : Denies any hematuria, hesitancy or dysuria. OBJECTIVE    VITALS:  temporal temperature is 97.1 °F (36.2 °C).  His blood pressure is 122/74 and his pulse is 82. His respiration is 18 and oxygen saturation is 98%. CONSTITUTIONAL: Alert and oriented times 3, no acute distress and cooperative to examination. SKIN: Skin color, texture, turgor normal. No rashes or lesions. INCISION: wound margins intact and healing well. No signs of infection. No drainage. ABDOMEN: soft, nontender, nondistended, no masses or organomegaly. Bowel sounds normal. umbilical and Laparoscopy scar present with prominent healing ridge. No sign of recurrence, hematoma or seroma. Tenderness to palpation incisional only.                     Electronically signed by April Rahman MD MD FACS on 6/30/2021 at 3:34 PM

## 2021-06-30 ENCOUNTER — OFFICE VISIT (OUTPATIENT)
Dept: SURGERY | Age: 61
End: 2021-06-30

## 2021-06-30 VITALS
OXYGEN SATURATION: 98 % | SYSTOLIC BLOOD PRESSURE: 122 MMHG | RESPIRATION RATE: 18 BRPM | DIASTOLIC BLOOD PRESSURE: 74 MMHG | TEMPERATURE: 97.1 F | HEART RATE: 82 BPM

## 2021-06-30 DIAGNOSIS — Z98.890 S/P VENTRAL HERNIORRHAPHY: Primary | ICD-10-CM

## 2021-06-30 DIAGNOSIS — Z87.19 S/P VENTRAL HERNIORRHAPHY: Primary | ICD-10-CM

## 2021-06-30 PROCEDURE — 99024 POSTOP FOLLOW-UP VISIT: CPT | Performed by: SURGERY

## 2021-06-30 NOTE — LETTER
[facility]  Daria Pandya MD FACS  Phone- [phone]  Fax [phone]    Pt Name: Trevor Clement  Medical Record Number: [id number]  Date of Birth 1960   Today's Date: 6/30/2021    Vineet Mcleod was evaluated in the office today. My assessment and plans are listed below. Assessment:     Echo Mckeon was seen today for post-op check. Diagnoses and all orders for this visit:    S/P ventral herniorrhaphy         Plan:  Pathology reviewed with the patient who understands. All questions were answered. New Prescriptions    No medications on file     Patient Instructions   RTW on July 12, 2021 no restrictions       Follow up: Return if symptoms worsen or fail to improve. Orders Placed This Encounter:  No orders of the defined types were placed in this encounter. If I can provide any additional assistance or you have any concerns, please feel free to contact me. Thank you for allowing to participate in the care of your patients. Sincerely,      Daria Pandya MD FACS  [address]  [place], [address]  Office: [phone]  Fax: [phone]

## 2021-06-30 NOTE — LETTER
[address] [facility]  [place] [address] [address] [facility] [address]  Phone: [phone]  Fax: [phone]    Dawson Holland MD        2021     Patient: Ebony    YOB: 1960   Date of Visit: 2021       To Whom It May Concern: It is my medical opinion that Suhas Heard may return to work on 2021 no restrictions. If you have any questions or concerns, please don't hesitate to call.     Sincerely,         Dawson Holland MD

## 2021-07-07 ENCOUNTER — TELEPHONE (OUTPATIENT)
Dept: SURGERY | Age: 61
End: 2021-07-07

## 2021-07-07 NOTE — TELEPHONE ENCOUNTER
Pt is s/p robot repair of recurrent incisional hernia 6/22, pt calling the office stating he is still pretty sore and is requesting an additional 2 weeks off work. He was originally supposed to return with no restrictions 7/12. Ok to extend off work time?     Fax [phone] Attn Ash Huntley

## 2021-08-06 ENCOUNTER — TELEPHONE (OUTPATIENT)
Dept: SURGERY | Age: 61
End: 2021-08-06

## 2021-08-06 NOTE — TELEPHONE ENCOUNTER
S/p 6/22 Robotic repair of recurrent incisional hernia    Dr. Morales Inch patient calling with c/o redness, irritation, and thick purulent drainage coming from each incision. No fevers or foul odors. Also c/o internal suture popping through. Pt wondering if he should start an abx? If so, preferred pharmacy is [facility] in [place]. Appointment made with Dr. Siri Fleming 8/16 to be reevaluated.

## 2021-08-06 NOTE — TELEPHONE ENCOUNTER
Those are all suture reactions from absorbable suture. They will come to a head and drain. Just topical antibiotic ointment for now.

## 2024-10-02 ENCOUNTER — HOSPITAL ENCOUNTER (OUTPATIENT)
Dept: INTERVENTIONAL RADIOLOGY/VASCULAR | Age: 64
Discharge: HOME | End: 2024-10-04

## 2024-10-02 DIAGNOSIS — Z13.6 ENCOUNTER FOR SCREENING FOR VASCULAR DISEASE: ICD-10-CM

## 2024-10-02 PROCEDURE — 9900000021 US VASCULAR SCREENING

## (undated) DEVICE — NITINOL STONE RETRIEVAL BASKET: Brand: ZERO TIP

## (undated) DEVICE — GLOVE SURG SZ 65 THK91MIL LTX FREE SYN POLYISOPRENE

## (undated) DEVICE — URETERAL ACCESS SHEATH SET: Brand: NAVIGATOR HD

## (undated) DEVICE — BREAST HERNIA PACK: Brand: MEDLINE INDUSTRIES, INC.

## (undated) DEVICE — SUTURE VCRL + SZ 4-0 L27IN ABSRB WHT FS-2 3/8 CIR REV CUT VCP422H

## (undated) DEVICE — GLOVE ORANGE PI 7   MSG9070

## (undated) DEVICE — APPLICATOR PREP 26ML 0.7% IOD POVACRYLEX 74% ISO ALC ST

## (undated) DEVICE — GLOVE ORANGE PI 7 1/2   MSG9075

## (undated) DEVICE — FIBER LASER HOLM DISP SU200RT] LEONI FIBER OPTICS INC]

## (undated) DEVICE — SUTURE PROL SZ 0 L30IN NONABSORBABLE BLU L36MM CT-1 1/2 CIR 8424H

## (undated) DEVICE — SUTURE VCRL + SZ 2-0 L27IN ABSRB CLR CT-1 1/2 CIR TAPERCUT VCP259H

## (undated) DEVICE — GUIDEWIRE ENDOSCP L150CM DIA0.035IN TIP 3CM PTFE NIT

## (undated) DEVICE — GLOVE ORANGE PI 8   MSG9080

## (undated) DEVICE — ADHESIVE SKIN CLSR 0.7ML TOP DERMBND ADV